# Patient Record
Sex: FEMALE | Race: BLACK OR AFRICAN AMERICAN | Employment: OTHER | URBAN - METROPOLITAN AREA
[De-identification: names, ages, dates, MRNs, and addresses within clinical notes are randomized per-mention and may not be internally consistent; named-entity substitution may affect disease eponyms.]

---

## 2018-12-20 ENCOUNTER — HOSPITAL ENCOUNTER (OUTPATIENT)
Dept: PREADMISSION TESTING | Age: 63
Discharge: HOME | End: 2018-12-20
Payer: MEDICARE

## 2018-12-20 VITALS — WEIGHT: 181 LBS | HEIGHT: 65 IN | BODY MASS INDEX: 30.16 KG/M2

## 2018-12-20 LAB
ANION GAP SERPL CALC-SCNC: 6 MMOL/L (ref 3–18)
BASOPHILS # BLD: 0 K/UL (ref 0–0.1)
BASOPHILS NFR BLD: 0 % (ref 0–2)
BUN SERPL-MCNC: 12 MG/DL (ref 7–18)
BUN/CREAT SERPL: 17
CALCIUM SERPL-MCNC: 9.6 MG/DL (ref 8.5–10.1)
CHLORIDE SERPL-SCNC: 104 MMOL/L (ref 100–108)
CO2 SERPL-SCNC: 31 MMOL/L (ref 21–32)
CREAT SERPL-MCNC: 0.69 MG/DL (ref 0.6–1.3)
DIFFERENTIAL METHOD BLD: ABNORMAL
EOSINOPHIL # BLD: 0.2 K/UL (ref 0–0.4)
EOSINOPHIL NFR BLD: 2 % (ref 0–5)
ERYTHROCYTE [DISTWIDTH] IN BLOOD BY AUTOMATED COUNT: 17.1 % (ref 11.6–14.5)
EST. AVERAGE GLUCOSE BLD GHB EST-MCNC: 157 MG/DL
GLUCOSE SERPL-MCNC: 68 MG/DL (ref 74–99)
HBA1C MFR BLD: 7.1 % (ref 4.2–5.6)
HCT VFR BLD AUTO: 36.2 % (ref 35–45)
HGB BLD-MCNC: 10.7 G/DL (ref 12–16)
LYMPHOCYTES # BLD: 1.9 K/UL (ref 0.9–3.6)
LYMPHOCYTES NFR BLD: 18 % (ref 21–52)
MCH RBC QN AUTO: 22.1 PG (ref 24–34)
MCHC RBC AUTO-ENTMCNC: 29.6 G/DL (ref 31–37)
MCV RBC AUTO: 74.6 FL (ref 74–97)
MONOCYTES # BLD: 0.7 K/UL (ref 0.05–1.2)
MONOCYTES NFR BLD: 7 % (ref 3–10)
NEUTS SEG # BLD: 7.8 K/UL (ref 1.8–8)
NEUTS SEG NFR BLD: 73 % (ref 40–73)
PLATELET # BLD AUTO: 411 K/UL (ref 135–420)
PMV BLD AUTO: 8.8 FL (ref 9.2–11.8)
POTASSIUM SERPL-SCNC: 3.8 MMOL/L (ref 3.5–5.5)
RBC # BLD AUTO: 4.85 M/UL (ref 4.2–5.3)
SODIUM SERPL-SCNC: 141 MMOL/L (ref 136–145)
WBC # BLD AUTO: 10.5 K/UL (ref 4.6–13.2)

## 2018-12-20 PROCEDURE — 85025 COMPLETE CBC W/AUTO DIFF WBC: CPT

## 2018-12-20 PROCEDURE — 83036 HEMOGLOBIN GLYCOSYLATED A1C: CPT

## 2018-12-20 PROCEDURE — 36415 COLL VENOUS BLD VENIPUNCTURE: CPT

## 2018-12-20 PROCEDURE — 80048 BASIC METABOLIC PNL TOTAL CA: CPT

## 2018-12-20 RX ORDER — TRAMADOL HYDROCHLORIDE 50 MG/1
50 TABLET ORAL
COMMUNITY
End: 2019-05-19

## 2018-12-20 RX ORDER — LANOLIN ALCOHOL/MO/W.PET/CERES
CREAM (GRAM) TOPICAL
COMMUNITY
End: 2021-02-01

## 2018-12-20 RX ORDER — CLONIDINE HYDROCHLORIDE 0.2 MG/1
0.2 TABLET ORAL 2 TIMES DAILY
COMMUNITY

## 2018-12-20 RX ORDER — DIPHENHYDRAMINE HCL 25 MG
25 CAPSULE ORAL
COMMUNITY
End: 2018-12-28

## 2018-12-20 RX ORDER — CEFAZOLIN SODIUM/WATER 2 G/20 ML
2 SYRINGE (ML) INTRAVENOUS ONCE
Status: CANCELLED | OUTPATIENT
Start: 2018-12-20 | End: 2018-12-20

## 2018-12-20 RX ORDER — IBUPROFEN 800 MG/1
800 TABLET ORAL AS NEEDED
COMMUNITY
End: 2018-12-28

## 2018-12-20 RX ORDER — VALACYCLOVIR HYDROCHLORIDE 1 G/1
TABLET, FILM COATED ORAL DAILY
COMMUNITY

## 2018-12-20 NOTE — PERIOP NOTES
Uses CPAP and was instructed to bring on admission. No removable prosthetic devices or family history of malignant hyperthermia. Care fusion kit and instructions given and reviewed. PCP is aware of the surgery. No participation in clinical trial or research study. Does not meet criteria for special population at this time. Pt states she was told to stop aspirin 7 days prior to surgery.

## 2018-12-28 ENCOUNTER — HOSPITAL ENCOUNTER (EMERGENCY)
Age: 63
Discharge: HOME | End: 2018-12-29
Attending: EMERGENCY MEDICINE
Payer: MEDICARE

## 2018-12-28 DIAGNOSIS — R10.31 RLQ ABDOMINAL PAIN: Primary | ICD-10-CM

## 2018-12-28 DIAGNOSIS — R19.7 NAUSEA VOMITING AND DIARRHEA: ICD-10-CM

## 2018-12-28 DIAGNOSIS — R11.2 NAUSEA VOMITING AND DIARRHEA: ICD-10-CM

## 2018-12-28 PROCEDURE — 93005 ELECTROCARDIOGRAM TRACING: CPT

## 2018-12-28 PROCEDURE — 99283 EMERGENCY DEPT VISIT LOW MDM: CPT

## 2018-12-28 RX ORDER — SODIUM CHLORIDE 0.9 % (FLUSH) 0.9 %
5-10 SYRINGE (ML) INJECTION AS NEEDED
Status: DISCONTINUED | OUTPATIENT
Start: 2018-12-28 | End: 2018-12-29 | Stop reason: HOSPADM

## 2018-12-29 ENCOUNTER — APPOINTMENT (OUTPATIENT)
Dept: CT IMAGING | Age: 63
End: 2018-12-29
Attending: PHYSICIAN ASSISTANT
Payer: MEDICARE

## 2018-12-29 VITALS
HEART RATE: 100 BPM | BODY MASS INDEX: 30.9 KG/M2 | SYSTOLIC BLOOD PRESSURE: 157 MMHG | OXYGEN SATURATION: 100 % | HEIGHT: 64 IN | WEIGHT: 181 LBS | DIASTOLIC BLOOD PRESSURE: 91 MMHG | RESPIRATION RATE: 18 BRPM | TEMPERATURE: 98.9 F

## 2018-12-29 LAB
ALBUMIN SERPL-MCNC: 3.7 G/DL (ref 3.4–5)
ALBUMIN/GLOB SERPL: 1 {RATIO} (ref 0.8–1.7)
ALP SERPL-CCNC: 133 U/L (ref 45–117)
ALT SERPL-CCNC: 17 U/L (ref 13–56)
ANION GAP SERPL CALC-SCNC: 7 MMOL/L (ref 3–18)
APPEARANCE UR: ABNORMAL
AST SERPL-CCNC: 11 U/L (ref 15–37)
BACTERIA URNS QL MICRO: ABNORMAL /HPF
BASOPHILS # BLD: 0 K/UL (ref 0–0.1)
BASOPHILS NFR BLD: 0 % (ref 0–2)
BILIRUB SERPL-MCNC: 0.3 MG/DL (ref 0.2–1)
BILIRUB UR QL: NEGATIVE
BUN SERPL-MCNC: 14 MG/DL (ref 7–18)
BUN/CREAT SERPL: 16
CALCIUM SERPL-MCNC: 9.8 MG/DL (ref 8.5–10.1)
CHLORIDE SERPL-SCNC: 104 MMOL/L (ref 100–108)
CO2 SERPL-SCNC: 29 MMOL/L (ref 21–32)
COLOR UR: YELLOW
CREAT SERPL-MCNC: 0.9 MG/DL (ref 0.6–1.3)
DIFFERENTIAL METHOD BLD: ABNORMAL
EOSINOPHIL # BLD: 0.1 K/UL (ref 0–0.4)
EOSINOPHIL NFR BLD: 1 % (ref 0–5)
EPITH CASTS URNS QL MICRO: ABNORMAL /LPF (ref 0–5)
ERYTHROCYTE [DISTWIDTH] IN BLOOD BY AUTOMATED COUNT: 16.8 % (ref 11.6–14.5)
GLOBULIN SER CALC-MCNC: 3.7 G/DL (ref 2–4)
GLUCOSE SERPL-MCNC: 122 MG/DL (ref 74–99)
GLUCOSE UR STRIP.AUTO-MCNC: NEGATIVE MG/DL
HCT VFR BLD AUTO: 37.2 % (ref 35–45)
HGB BLD-MCNC: 11.4 G/DL (ref 12–16)
HGB UR QL STRIP: NEGATIVE
KETONES UR QL STRIP.AUTO: ABNORMAL MG/DL
LACTATE SERPL-SCNC: 1.5 MMOL/L (ref 0.4–2)
LEUKOCYTE ESTERASE UR QL STRIP.AUTO: ABNORMAL
LYMPHOCYTES # BLD: 1 K/UL (ref 0.9–3.6)
LYMPHOCYTES NFR BLD: 7 % (ref 21–52)
MCH RBC QN AUTO: 22.2 PG (ref 24–34)
MCHC RBC AUTO-ENTMCNC: 30.6 G/DL (ref 31–37)
MCV RBC AUTO: 72.4 FL (ref 74–97)
MONOCYTES # BLD: 0.7 K/UL (ref 0.05–1.2)
MONOCYTES NFR BLD: 4 % (ref 3–10)
MUCOUS THREADS URNS QL MICRO: ABNORMAL /LPF
NEUTS SEG # BLD: 13.6 K/UL (ref 1.8–8)
NEUTS SEG NFR BLD: 88 % (ref 40–73)
NITRITE UR QL STRIP.AUTO: NEGATIVE
PH UR STRIP: 5.5 [PH] (ref 5–8)
PLATELET # BLD AUTO: 460 K/UL (ref 135–420)
PMV BLD AUTO: 8.6 FL (ref 9.2–11.8)
POTASSIUM SERPL-SCNC: 3.9 MMOL/L (ref 3.5–5.5)
PROT SERPL-MCNC: 7.4 G/DL (ref 6.4–8.2)
PROT UR STRIP-MCNC: 100 MG/DL
RBC # BLD AUTO: 5.14 M/UL (ref 4.2–5.3)
RBC #/AREA URNS HPF: NEGATIVE /HPF (ref 0–5)
SODIUM SERPL-SCNC: 140 MMOL/L (ref 136–145)
SP GR UR REFRACTOMETRY: 1.03 (ref 1–1.03)
UROBILINOGEN UR QL STRIP.AUTO: 0.2 EU/DL (ref 0.2–1)
WBC # BLD AUTO: 15.4 K/UL (ref 4.6–13.2)
WBC URNS QL MICRO: ABNORMAL /HPF (ref 0–5)

## 2018-12-29 PROCEDURE — 81001 URINALYSIS AUTO W/SCOPE: CPT

## 2018-12-29 PROCEDURE — 87040 BLOOD CULTURE FOR BACTERIA: CPT

## 2018-12-29 PROCEDURE — 80053 COMPREHEN METABOLIC PANEL: CPT

## 2018-12-29 PROCEDURE — 96375 TX/PRO/DX INJ NEW DRUG ADDON: CPT

## 2018-12-29 PROCEDURE — 74011250636 HC RX REV CODE- 250/636: Performed by: PHYSICIAN ASSISTANT

## 2018-12-29 PROCEDURE — 96365 THER/PROPH/DIAG IV INF INIT: CPT

## 2018-12-29 PROCEDURE — 85025 COMPLETE CBC W/AUTO DIFF WBC: CPT

## 2018-12-29 PROCEDURE — 74011000258 HC RX REV CODE- 258: Performed by: PHYSICIAN ASSISTANT

## 2018-12-29 PROCEDURE — 83605 ASSAY OF LACTIC ACID: CPT

## 2018-12-29 PROCEDURE — 96361 HYDRATE IV INFUSION ADD-ON: CPT

## 2018-12-29 PROCEDURE — 74176 CT ABD & PELVIS W/O CONTRAST: CPT

## 2018-12-29 RX ORDER — ONDANSETRON 2 MG/ML
INJECTION INTRAMUSCULAR; INTRAVENOUS
Status: DISCONTINUED
Start: 2018-12-29 | End: 2018-12-29 | Stop reason: HOSPADM

## 2018-12-29 RX ORDER — ONDANSETRON 4 MG/1
4 TABLET, ORALLY DISINTEGRATING ORAL
Qty: 20 TAB | Refills: 0 | Status: SHIPPED | OUTPATIENT
Start: 2018-12-29 | End: 2021-02-01

## 2018-12-29 RX ORDER — PROMETHAZINE HYDROCHLORIDE 25 MG/1
25 TABLET ORAL
Qty: 12 TAB | Refills: 0 | Status: SHIPPED | OUTPATIENT
Start: 2018-12-29 | End: 2019-05-19

## 2018-12-29 RX ORDER — ONDANSETRON 2 MG/ML
4 INJECTION INTRAMUSCULAR; INTRAVENOUS
Status: COMPLETED | OUTPATIENT
Start: 2018-12-29 | End: 2018-12-29

## 2018-12-29 RX ADMIN — SODIUM CHLORIDE 1000 ML: 900 INJECTION, SOLUTION INTRAVENOUS at 00:15

## 2018-12-29 RX ADMIN — PROMETHAZINE HYDROCHLORIDE 25 MG: 25 INJECTION, SOLUTION INTRAMUSCULAR; INTRAVENOUS at 02:31

## 2018-12-29 RX ADMIN — SODIUM CHLORIDE 641 ML: 900 INJECTION, SOLUTION INTRAVENOUS at 00:16

## 2018-12-29 RX ADMIN — ONDANSETRON 4 MG: 2 INJECTION INTRAMUSCULAR; INTRAVENOUS at 01:34

## 2018-12-29 NOTE — DISCHARGE INSTRUCTIONS
Abdominal Pain: Care Instructions  Your Care Instructions    Abdominal pain has many possible causes. Some aren't serious and get better on their own in a few days. Others need more testing and treatment. If your pain continues or gets worse, you need to be rechecked and may need more tests to find out what is wrong. You may need surgery to correct the problem. Don't ignore new symptoms, such as fever, nausea and vomiting, urination problems, pain that gets worse, and dizziness. These may be signs of a more serious problem. Your doctor may have recommended a follow-up visit in the next 8 to 12 hours. If you are not getting better, you may need more tests or treatment. The doctor has checked you carefully, but problems can develop later. If you notice any problems or new symptoms, get medical treatment right away. Follow-up care is a key part of your treatment and safety. Be sure to make and go to all appointments, and call your doctor if you are having problems. It's also a good idea to know your test results and keep a list of the medicines you take. How can you care for yourself at home? · Rest until you feel better. · To prevent dehydration, drink plenty of fluids, enough so that your urine is light yellow or clear like water. Choose water and other caffeine-free clear liquids until you feel better. If you have kidney, heart, or liver disease and have to limit fluids, talk with your doctor before you increase the amount of fluids you drink. · If your stomach is upset, eat mild foods, such as rice, dry toast or crackers, bananas, and applesauce. Try eating several small meals instead of two or three large ones. · Wait until 48 hours after all symptoms have gone away before you have spicy foods, alcohol, and drinks that contain caffeine. · Do not eat foods that are high in fat. · Avoid anti-inflammatory medicines such as aspirin, ibuprofen (Advil, Motrin), and naproxen (Aleve).  These can cause stomach upset. Talk to your doctor if you take daily aspirin for another health problem. When should you call for help? Call 911 anytime you think you may need emergency care. For example, call if:    · You passed out (lost consciousness).     · You pass maroon or very bloody stools.     · You vomit blood or what looks like coffee grounds.     · You have new, severe belly pain.    Call your doctor now or seek immediate medical care if:    · Your pain gets worse, especially if it becomes focused in one area of your belly.     · You have a new or higher fever.     · Your stools are black and look like tar, or they have streaks of blood.     · You have unexpected vaginal bleeding.     · You have symptoms of a urinary tract infection. These may include:  ? Pain when you urinate. ? Urinating more often than usual.  ? Blood in your urine.     · You are dizzy or lightheaded, or you feel like you may faint.    Watch closely for changes in your health, and be sure to contact your doctor if:    · You are not getting better after 1 day (24 hours). Where can you learn more? Go to http://raimundo-joselyn.info/. Enter N530 in the search box to learn more about \"Abdominal Pain: Care Instructions. \"  Current as of: November 20, 2017  Content Version: 11.8  © 1687-5026 [employer]. Care instructions adapted under license by [employer] (which disclaims liability or warranty for this information). If you have questions about a medical condition or this instruction, always ask your healthcare professional. [place] [address] any warranty or liability for your use of this information. Nausea and Vomiting: Care Instructions  Your Care Instructions    When you are nauseated, you may feel weak and sweaty and notice a lot of saliva in your mouth. Nausea often leads to vomiting.  Most of the time you do not need to worry about nausea and vomiting, but they can be signs of other illnesses. Two common causes of nausea and vomiting are stomach flu and food poisoning. Nausea and vomiting from viral stomach flu will usually start to improve within 24 hours. Nausea and vomiting from food poisoning may last from 12 to 48 hours. The doctor has checked you carefully, but problems can develop later. If you notice any problems or new symptoms, get medical treatment right away. Follow-up care is a key part of your treatment and safety. Be sure to make and go to all appointments, and call your doctor if you are having problems. It's also a good idea to know your test results and keep a list of the medicines you take. How can you care for yourself at home? · To prevent dehydration, drink plenty of fluids, enough so that your urine is light yellow or clear like water. Choose water and other caffeine-free clear liquids until you feel better. If you have kidney, heart, or liver disease and have to limit fluids, talk with your doctor before you increase the amount of fluids you drink. · Rest in bed until you feel better. · When you are able to eat, try clear soups, mild foods, and liquids until all symptoms are gone for 12 to 48 hours. Other good choices include dry toast, crackers, cooked cereal, and gelatin dessert, such as Jell-O. When should you call for help? Call 911 anytime you think you may need emergency care. For example, call if:    · You passed out (lost consciousness).    Call your doctor now or seek immediate medical care if:    · You have symptoms of dehydration, such as:  ? Dry eyes and a dry mouth. ? Passing only a little dark urine. ?  Feeling thirstier than usual.     · You have new or worsening belly pain.     · You have a new or higher fever.     · You vomit blood or what looks like coffee grounds.    Watch closely for changes in your health, and be sure to contact your doctor if:    · You have ongoing nausea and vomiting.     · Your vomiting is getting worse.     · Your vomiting lasts longer than 2 days.     · You are not getting better as expected. Where can you learn more? Go to http://raimundo-joselyn.info/. Enter 25 [id number] in the search box to learn more about \"Nausea and Vomiting: Care Instructions. \"  Current as of: November 20, 2017  Content Version: 11.8  © 3664-0907 [employer]. Care instructions adapted under license by [employer] (which disclaims liability or warranty for this information). If you have questions about a medical condition or this instruction, always ask your healthcare professional. [place] [address] any warranty or liability for your use of this information.

## 2018-12-29 NOTE — ED NOTES
Patient requesting water with ice to drink, advised patient that this would not be a good idea at this time due to CT scan pending. She still requested water, this nurse did not give.

## 2018-12-29 NOTE — ED TRIAGE NOTES
Presented to ED to be evaluated for reported RLQ pain, fever, nausea/vomiting, diarrhea. X 3 days. Sepsis Screening completed (  )Patient meets SIRS criteria. (x  )Patient does not meet SIRS criteria. SIRS Criteria is achieved when two or more of the following are present ? Temperature < 96.8°F (36°C) or > 100.9°F (38.3°C) ? Heart Rate > 90 beats per minute ? Respiratory Rate > 20 breaths per minute ? WBC count > 12,000 or <4,000 or > 10% bands

## 2018-12-29 NOTE — ED PROVIDER NOTES
EMERGENCY DEPARTMENT HISTORY AND PHYSICAL EXAM 
 
Date: 12/28/2018 Patient Name: Placido Schmidt History of Presenting Illness Chief Complaint Patient presents with  Abdominal Pain  Fever  Diarrhea History Provided By: Patient Chief Complaint: Abdominal Pain Duration: 3 Days Timing:  Acute Location: RLQ Quality: Maria Elena Peralta Severity: Mild Modifying Factors: No modifying factors Associated Symptoms: n/v/d, chills Additional History (Context):  
11:33 PM 
Placido Schmidt is a 61 y.o. female with PMHX of Anxiety, Depression, GERD, Arrhythmia, HTN, DM, OA, Neuropathy, Sleep apnea, who presents to the emergency department C/O RLQ abdominal pain onset 3 days ago. Associated sxs include n/v/d, chills. Pt denies dysuria, blood in stool, fever, taking any medications for relief of sxs, and any other sxs or complaints. PCP: Debi Salinas MD 
 
Current Facility-Administered Medications Medication Dose Route Frequency Provider Last Rate Last Dose  ondansetron (ZOFRAN) 4 mg/2 mL injection  iopamidol (ISOVUE 300) 61 % contrast injection 100 mL  100 mL IntraVENous RAD ONCE Jeniffer Hunter MD      
 promethazine (PHENERGAN) 25 mg in 0.9% sodium chloride 50 mL IVPB  25 mg IntraVENous NOW Bob Davis PA      
 sodium chloride (NS) flush 5-10 mL  5-10 mL IntraVENous PRN Rudy Davis PA Current Outpatient Medications Medication Sig Dispense Refill  ondansetron (ZOFRAN ODT) 4 mg disintegrating tablet Take 1 Tab by mouth every eight (8) hours as needed for Nausea. 20 Tab 0  promethazine (PHENERGAN) 25 mg tablet Take 1 Tab by mouth every six (6) hours as needed. 12 Tab 0  cloNIDine HCl (CATAPRES) 0.2 mg tablet Take 0.2 mg by mouth two (2) times a day.  Omega-3 Fatty Acids (FISH OIL) 500 mg cap Take  by mouth daily.  ferrous sulfate (IRON) 325 mg (65 mg iron) tablet Take  by mouth Daily (before breakfast).  lidocaine-kinesiology tape 5 % kit Apply  to affected area as needed.  traMADol (ULTRAM) 50 mg tablet Take 50 mg by mouth every six (6) hours as needed for Pain.  valACYclovir (VALTREX) 1 gram tablet Take  by mouth daily.  diltiazem LA (CARDIZEM LA) 420 mg ER tablet Take 420 mg by mouth daily.  metFORMIN (GLUCOPHAGE) 500 mg tablet Take 500 mg by mouth two (2) times daily (with meals).  aspirin delayed-release 81 mg tablet Take  by mouth daily.  gabapentin (NEURONTIN) 100 mg capsule Take 300 mg by mouth two (2) times a day.  potassium chloride (K-DUR, KLOR-CON) 20 mEq tablet Take 20 mEq by mouth every other day. Past History Past Medical History: 
Past Medical History:  
Diagnosis Date  Arrhythmia 1990's SVT- from low potassium  Chronic pain   
 neck, back, left shoulder  Diabetes [facility]) Summer 2018  GERD (gastroesophageal reflux disease)  Hypercholesterolemia   
 taken off medication 1990's  Hypertension late 1970's  Insomnia  Menopause  Neuropathy  OA (osteoarthritis)  Psychiatric disorder   
 depression, anxiety  Sleep apnea   
 uses CPAP Past Surgical History: 
Past Surgical History:  
Procedure Laterality Date  HX BREAST BIOPSY    
 right  HX CERVICAL FUSION  2012  
 anterior  HX GI  2017  
 colonoscopy  HX HYSTERECTOMY  1991  
 HX KNEE ARTHROSCOPY  2011  
 right  HX OTHER SURGICAL    
 hemorrhoidectomy  HX SHOULDER ARTHROSCOPY    
 left  HX TONSILLECTOMY  GIORGI BIOPSY BREAST STEREOTACTIC    
 right Family History: 
Family History Problem Relation Age of Onset  Malignant Hyperthermia Neg Hx  Pseudocholinesterase Deficiency Neg Hx  Delayed Awakening Neg Hx  Post-op Nausea/Vomiting Neg Hx  Emergence Delirium Neg Hx  Post-op Cognitive Dysfunction Neg Hx   
 Other Neg Hx Social History: 
Social History Tobacco Use  Smoking status: Never Smoker  Smokeless tobacco: Never Used Substance Use Topics  Alcohol use: No  
 Drug use: No  
 
 
Allergies: Allergies Allergen Reactions  Acetaminophen Itching Restless legs  Vicodin [Hydrocodone-Acetaminophen] Itching Review of Systems Review of Systems Constitutional: Positive for chills. Negative for fever. Gastrointestinal: Positive for abdominal pain (RLQ), diarrhea, nausea and vomiting. Negative for blood in stool. All other systems reviewed and are negative. Physical Exam  
 
Vitals:  
 12/28/18 2223 BP: 142/80 Pulse: (!) 102 Resp: 20 Temp: 99.9 °F (37.7 °C) SpO2: 100% Weight: 82.1 kg (181 lb) Height: 5' 4\" (1.626 m) Physical Exam  
Constitutional: She is oriented to person, place, and time. She appears well-developed and well-nourished. No distress. HENT:  
Head: Normocephalic and atraumatic. Eyes: Conjunctivae and EOM are normal. Pupils are equal, round, and reactive to light. Neck: Normal range of motion. Neck supple. Cardiovascular: Normal rate and regular rhythm. Pulmonary/Chest: Effort normal and breath sounds normal.  
Abdominal: Soft. Bowel sounds are normal. She exhibits no distension. There is no tenderness. There is no rebound, no guarding and no CVA tenderness. Points to RLQ as site of pain but NO ttp on exam  
Musculoskeletal: Normal range of motion. Neurological: She is alert and oriented to person, place, and time. Skin: Skin is warm and dry. Psychiatric: She has a normal mood and affect. Her behavior is normal.  
Nursing note and vitals reviewed. Diagnostic Study Results Labs - Recent Results (from the past 12 hour(s)) EKG, 12 LEAD, INITIAL Collection Time: 12/28/18 10:31 PM  
Result Value Ref Range Ventricular Rate 105 BPM  
 Atrial Rate 105 BPM  
 P-R Interval 160 ms QRS Duration 82 ms Q-T Interval 336 ms  
 QTC Calculation (Bezet) 444 ms Calculated P Axis 37 degrees Calculated R Axis -12 degrees Calculated T Axis 20 degrees Diagnosis Sinus tachycardia with premature atrial complexes Otherwise normal ECG When compared with ECG of 20-SEP-2013 08:53, 
premature atrial complexes are now present LACTIC ACID Collection Time: 12/29/18 12:01 AM  
Result Value Ref Range Lactic acid 1.5 0.4 - 2.0 MMOL/L  
URINALYSIS W/ RFLX MICROSCOPIC Collection Time: 12/29/18 12:07 AM  
Result Value Ref Range Color YELLOW Appearance CLOUDY Specific gravity 1.027 1.005 - 1.030    
 pH (UA) 5.5 5.0 - 8.0 Protein 100 (A) NEG mg/dL Glucose NEGATIVE  NEG mg/dL Ketone TRACE (A) NEG mg/dL Bilirubin NEGATIVE  NEG Blood NEGATIVE  NEG Urobilinogen 0.2 0.2 - 1.0 EU/dL Nitrites NEGATIVE  NEG Leukocyte Esterase TRACE (A) NEG    
METABOLIC PANEL, COMPREHENSIVE Collection Time: 12/29/18 12:07 AM  
Result Value Ref Range Sodium 140 136 - 145 mmol/L Potassium 3.9 3.5 - 5.5 mmol/L Chloride 104 100 - 108 mmol/L  
 CO2 29 21 - 32 mmol/L Anion gap 7 3.0 - 18 mmol/L Glucose 122 (H) 74 - 99 mg/dL BUN 14 7.0 - 18 MG/DL Creatinine 0.90 0.6 - 1.3 MG/DL  
 BUN/Creatinine ratio 16 GFR est AA >60 >60 ml/min/1.73m2 GFR est non-AA >60 >60 ml/min/1.73m2 Calcium 9.8 8.5 - 10.1 MG/DL Bilirubin, total 0.3 0.2 - 1.0 MG/DL  
 ALT (SGPT) 17 13 - 56 U/L  
 AST (SGOT) 11 (L) 15 - 37 U/L Alk. phosphatase 133 (H) 45 - 117 U/L Protein, total 7.4 6.4 - 8.2 g/dL Albumin 3.7 3.4 - 5.0 g/dL Globulin 3.7 2.0 - 4.0 g/dL A-G Ratio 1.0 0.8 - 1.7    
CBC WITH AUTOMATED DIFF Collection Time: 12/29/18 12:07 AM  
Result Value Ref Range WBC 15.4 (H) 4.6 - 13.2 K/uL  
 RBC 5.14 4.20 - 5.30 M/uL  
 HGB 11.4 (L) 12.0 - 16.0 g/dL HCT 37.2 35.0 - 45.0 % MCV 72.4 (L) 74.0 - 97.0 FL  
 MCH 22.2 (L) 24.0 - 34.0 PG  
 MCHC 30.6 (L) 31.0 - 37.0 g/dL  
 RDW 16.8 (H) 11.6 - 14.5 % PLATELET 194 (H) 759 - 420 K/uL MPV 8.6 (L) 9.2 - 11.8 FL  
 NEUTROPHILS 88 (H) 40 - 73 % LYMPHOCYTES 7 (L) 21 - 52 % MONOCYTES 4 3 - 10 % EOSINOPHILS 1 0 - 5 % BASOPHILS 0 0 - 2 %  
 ABS. NEUTROPHILS 13.6 (H) 1.8 - 8.0 K/UL  
 ABS. LYMPHOCYTES 1.0 0.9 - 3.6 K/UL  
 ABS. MONOCYTES 0.7 0.05 - 1.2 K/UL  
 ABS. EOSINOPHILS 0.1 0.0 - 0.4 K/UL  
 ABS. BASOPHILS 0.0 0.0 - 0.1 K/UL  
 DF AUTOMATED URINE MICROSCOPIC ONLY Collection Time: 12/29/18 12:07 AM  
Result Value Ref Range WBC 3 to 5 0 - 5 /hpf  
 RBC NEGATIVE  0 - 5 /hpf Epithelial cells FEW 0 - 5 /lpf Bacteria RARE NEG /hpf Mucus 3+ (A) NEG /lpf Radiologic Studies -  
CT ABD PELV WO CONT Final Result IMPRESSION:  
  
Appendix is not clearly seen, but there are no secondary findings to suggest  
acute appendicitis. No additional acute abnormality otherwise throughout the  
abdomen or pelvis to explain patient's right lower quadrant abdominal pain. CT Results  (Last 48 hours) 12/29/18 0129  CT ABD PELV WO CONT Final result Impression:  IMPRESSION:  
   
Appendix is not clearly seen, but there are no secondary findings to suggest  
acute appendicitis. No additional acute abnormality otherwise throughout the  
abdomen or pelvis to explain patient's right lower quadrant abdominal pain. Narrative:  EXAM: CT ABD PELV WO CONT  
   
CLINICAL INDICATION/HISTORY: Right lower quadrant abdominal pain COMPARISON: None. TECHNIQUE:   CT of the abdomen and pelvis without intravenous contrast  
administration. Coronal and sagittal reformats were generated and reviewed. One or more dose reduction techniques were used on this CT: automated exposure  
control, adjustment of the mAs and/or kVp according to patient size, and  
iterative reconstruction techniques. The specific techniques used on this CT  
exam have been documented in the patient's electronic medical record. _______________ FINDINGS:  
   
LOWER THORAX:  No acute pulmonary infiltrate. No pleural effusion. No global  
cardiomegaly or pericardial effusion. LIVER AND BILIARY: No suspicious liver lesions on this unenhanced CT. No  
biliary dilation. Gallbladder unremarkable. SPLEEN:  Normal.  
   
PANCREAS:  Normal.  
   
ADRENALS:  Normal.  
   
KIDNEYS:  Normal.  
   
LYMPH NODES:  No mesenteric or retroperitoneal lymphadenopathy. GI TRACT:  Small hiatal hernia. Normal caliber small and large bowel loops. No  
morphology of bowel obstruction. No bowel wall thickening. Appendix is not  
clearly seen, but there are no secondary findings to suggest acute appendicitis. PELVIC ORGANS:  Bladder is normal in appearance. No acute abnormality. VASCULATURE:  Normal caliber lower thoracic and abdominal aorta with mild  
atherosclerotic vascular calcification OTHER:   No ascites or free intraperitoneal air. OSSEOUS STRUCTURES:  No acute osseous abnormality. Mild degenerative disk  
disease and facet arthropathy focally at L5-S1.  
   
_______________ CXR Results  (Last 48 hours) None Medications given in the ED- Medications  
sodium chloride (NS) flush 5-10 mL (not administered) iopamidol (ISOVUE 300) 61 % contrast injection 100 mL (not administered)  
ondansetron (ZOFRAN) 4 mg/2 mL injection (not administered)  
promethazine (PHENERGAN) 25 mg in 0.9% sodium chloride 50 mL IVPB (not administered)  
sodium chloride 0.9 % bolus infusion 1,000 mL (1,000 mL IntraVENous New Bag 12/29/18 0015) Followed by  
sodium chloride 0.9 % bolus infusion 641 mL (641 mL IntraVENous New Bag 12/29/18 0016) ondansetron [facility]) injection 4 mg (4 mg IntraVENous Given 12/29/18 0134) Medical Decision Making I am the first provider for this patient.  
 
I reviewed the vital signs, available nursing notes, past medical history, past surgical history, family history and social history. Vital Signs-Reviewed the patient's vital signs. Pulse Oximetry Analysis - 100% on RA Cardiac Monitor: 
Rate: 105 bpm 
Rhythm: Sinus tachycardia EKG interpretation: (Preliminary) 10:33 PM  
Sinus tachycardia, 105 bpm, QRS 82 ms EKG read by Lore Marin MD at 10:31 PM  
 
Records Reviewed: Nursing Notes and Old Medical Records Procedures: 
Procedures ED Course:  
11:33 PM Initial assessment performed. The patients presenting problems have been discussed, and they are in agreement with the care plan formulated and outlined with them. I have encouraged them to ask questions as they arise throughout their visit. 1:25 AM Jorge from CT called and said pt declined IV contrast. CT will be done dry. 2:11 AM Discussed patient's history, exam, and available diagnostics results with [employer], MD, ED Attending, who agree with d/c plan. Likely viral illness n/v/d since no findings on ct to show acute process. Diagnosis and Disposition DISCHARGE NOTE: 
2:16 AM  
Flavia Arroyo  results have been reviewed with her. She has been counseled regarding her diagnosis, treatment, and plan. She verbally conveys understanding and agreement of the signs, symptoms, diagnosis, treatment and prognosis and additionally agrees to follow up as discussed. She also agrees with the care-plan and conveys that all of her questions have been answered. I have also provided discharge instructions for her that include: educational information regarding their diagnosis and treatment, and list of reasons why they would want to return to the ED prior to their follow-up appointment, should her condition change. She has been provided with education for proper emergency department utilization. CLINICAL IMPRESSION: 
 
1. RLQ abdominal pain 2. Nausea vomiting and diarrhea PLAN: 
1. D/C Home 2.   
Current Discharge Medication List  
  
 START taking these medications Details  
ondansetron (ZOFRAN ODT) 4 mg disintegrating tablet Take 1 Tab by mouth every eight (8) hours as needed for Nausea. Qty: 20 Tab, Refills: 0  
  
promethazine (PHENERGAN) 25 mg tablet Take 1 Tab by mouth every six (6) hours as needed. Qty: 12 Tab, Refills: 0  
  
  
 
3. Follow-up Information Follow up With Specialties Details Why Contact Info Scott Mcnair MD Internal Medicine Schedule an appointment as soon as possible for a visit  [address] [place] [address] 
[phone] [facility] EMERGENCY DEPT Emergency Medicine  As needed, If symptoms worsen [address] [place] [id number] [phone]  
  
 
_______________________________ Attestations: This note is prepared by ALEJANDRO VOGT and Kim Carrion, acting as Scribe for  Adi Anthony PA-C. Adi Anthony PA-C:  The scribe's documentation has been prepared under my direction and personally reviewed by me in its entirety. I confirm that the note above accurately reflects all work, treatment, procedures, and medical decision making performed by me. 
_______________________________

## 2019-01-04 LAB
BACTERIA SPEC CULT: NORMAL
BACTERIA SPEC CULT: NORMAL
SERVICE CMNT-IMP: NORMAL
SERVICE CMNT-IMP: NORMAL

## 2019-01-04 RX ORDER — SODIUM CHLORIDE 0.9 % (FLUSH) 0.9 %
5-10 SYRINGE (ML) INJECTION EVERY 8 HOURS
Status: CANCELLED | OUTPATIENT
Start: 2019-01-04

## 2019-01-04 RX ORDER — SODIUM CHLORIDE 0.9 % (FLUSH) 0.9 %
5-10 SYRINGE (ML) INJECTION AS NEEDED
Status: CANCELLED | OUTPATIENT
Start: 2019-01-04

## 2019-01-07 ENCOUNTER — ANESTHESIA EVENT (OUTPATIENT)
Dept: SURGERY | Age: 64
End: 2019-01-07
Payer: MEDICARE

## 2019-01-07 ENCOUNTER — HOSPITAL ENCOUNTER (OUTPATIENT)
Age: 64
Setting detail: OUTPATIENT SURGERY
Discharge: HOME | End: 2019-01-07
Attending: ORTHOPAEDIC SURGERY | Admitting: ORTHOPAEDIC SURGERY
Payer: MEDICARE

## 2019-01-07 ENCOUNTER — ANESTHESIA (OUTPATIENT)
Dept: SURGERY | Age: 64
End: 2019-01-07
Payer: MEDICARE

## 2019-01-07 VITALS
OXYGEN SATURATION: 95 % | TEMPERATURE: 97.6 F | RESPIRATION RATE: 18 BRPM | HEART RATE: 66 BPM | SYSTOLIC BLOOD PRESSURE: 123 MMHG | DIASTOLIC BLOOD PRESSURE: 65 MMHG | WEIGHT: 178.56 LBS | HEIGHT: 64 IN | BODY MASS INDEX: 30.48 KG/M2

## 2019-01-07 DIAGNOSIS — M75.42 ROTATOR CUFF IMPINGEMENT SYNDROME OF LEFT SHOULDER: Primary | Chronic | ICD-10-CM

## 2019-01-07 LAB
GLUCOSE BLD STRIP.AUTO-MCNC: 105 MG/DL (ref 70–110)
GLUCOSE BLD STRIP.AUTO-MCNC: 114 MG/DL (ref 70–110)

## 2019-01-07 PROCEDURE — 74011250636 HC RX REV CODE- 250/636

## 2019-01-07 PROCEDURE — 77030020782 HC GWN BAIR PAWS FLX 3M -B: Performed by: ORTHOPAEDIC SURGERY

## 2019-01-07 PROCEDURE — 82962 GLUCOSE BLOOD TEST: CPT

## 2019-01-07 PROCEDURE — 77030012711 HC WND ARTHRO ABLT S&N -D: Performed by: ORTHOPAEDIC SURGERY

## 2019-01-07 PROCEDURE — 77030002916 HC SUT ETHLN J&J -A: Performed by: ORTHOPAEDIC SURGERY

## 2019-01-07 PROCEDURE — 74011250636 HC RX REV CODE- 250/636: Performed by: ORTHOPAEDIC SURGERY

## 2019-01-07 PROCEDURE — 76942 ECHO GUIDE FOR BIOPSY: CPT | Performed by: ORTHOPAEDIC SURGERY

## 2019-01-07 PROCEDURE — 77030036565 HC WRP CLDTHER ANK S2SG -A: Performed by: ORTHOPAEDIC SURGERY

## 2019-01-07 PROCEDURE — 77030003598 HC NDL MULT/FIRE ARTH -C: Performed by: ORTHOPAEDIC SURGERY

## 2019-01-07 PROCEDURE — 74011000250 HC RX REV CODE- 250: Performed by: ORTHOPAEDIC SURGERY

## 2019-01-07 PROCEDURE — 64415 NJX AA&/STRD BRCH PLXS IMG: CPT | Performed by: ANESTHESIOLOGY

## 2019-01-07 PROCEDURE — 77030032490 HC SLV COMPR SCD KNE COVD -B: Performed by: ORTHOPAEDIC SURGERY

## 2019-01-07 PROCEDURE — C1713 ANCHOR/SCREW BN/BN,TIS/BN: HCPCS | Performed by: ORTHOPAEDIC SURGERY

## 2019-01-07 PROCEDURE — 76010000149 HC OR TIME 1 TO 1.5 HR: Performed by: ORTHOPAEDIC SURGERY

## 2019-01-07 PROCEDURE — 77030034478 HC TU IRR ARTHRO PT ARTH -B: Performed by: ORTHOPAEDIC SURGERY

## 2019-01-07 PROCEDURE — 77030004451 HC BUR SHV S&N -B: Performed by: ORTHOPAEDIC SURGERY

## 2019-01-07 PROCEDURE — 76210000026 HC REC RM PH II 1 TO 1.5 HR: Performed by: ORTHOPAEDIC SURGERY

## 2019-01-07 PROCEDURE — 76060000033 HC ANESTHESIA 1 TO 1.5 HR: Performed by: ORTHOPAEDIC SURGERY

## 2019-01-07 PROCEDURE — 77030018835 HC SOL IRR LR ICUM -A: Performed by: ORTHOPAEDIC SURGERY

## 2019-01-07 PROCEDURE — 77030012508 HC MSK AIRWY LMA AMBU -A: Performed by: ANESTHESIOLOGY

## 2019-01-07 PROCEDURE — 77030006884 HC BLD SHV INCIS S&N -B: Performed by: ORTHOPAEDIC SURGERY

## 2019-01-07 PROCEDURE — 77030018673: Performed by: ORTHOPAEDIC SURGERY

## 2019-01-07 PROCEDURE — 74011000250 HC RX REV CODE- 250: Performed by: ANESTHESIOLOGY

## 2019-01-07 PROCEDURE — 77030033269 HC SLV COMPR SCD KNE2 CARD -B: Performed by: ORTHOPAEDIC SURGERY

## 2019-01-07 PROCEDURE — 76210000006 HC OR PH I REC 0.5 TO 1 HR: Performed by: ORTHOPAEDIC SURGERY

## 2019-01-07 DEVICE — MULTIFIX S-ULTRA 5.5MM KNOTLESS ANCHOR
Type: IMPLANTABLE DEVICE | Site: SHOULDER | Status: FUNCTIONAL
Brand: MULTIFIX

## 2019-01-07 RX ORDER — FLUMAZENIL 0.1 MG/ML
0.2 INJECTION INTRAVENOUS
Status: DISCONTINUED | OUTPATIENT
Start: 2019-01-07 | End: 2019-01-07 | Stop reason: HOSPADM

## 2019-01-07 RX ORDER — FENTANYL CITRATE 50 UG/ML
INJECTION, SOLUTION INTRAMUSCULAR; INTRAVENOUS
Status: COMPLETED
Start: 2019-01-07 | End: 2019-01-07

## 2019-01-07 RX ORDER — DEXTROSE 50 % IN WATER (D50W) INTRAVENOUS SYRINGE
25-50 AS NEEDED
Status: DISCONTINUED | OUTPATIENT
Start: 2019-01-07 | End: 2019-01-07 | Stop reason: HOSPADM

## 2019-01-07 RX ORDER — INSULIN LISPRO 100 [IU]/ML
INJECTION, SOLUTION INTRAVENOUS; SUBCUTANEOUS ONCE
Status: DISCONTINUED | OUTPATIENT
Start: 2019-01-07 | End: 2019-01-07 | Stop reason: HOSPADM

## 2019-01-07 RX ORDER — ONDANSETRON 2 MG/ML
INJECTION INTRAMUSCULAR; INTRAVENOUS AS NEEDED
Status: DISCONTINUED | OUTPATIENT
Start: 2019-01-07 | End: 2019-01-07 | Stop reason: HOSPADM

## 2019-01-07 RX ORDER — ROPIVACAINE HYDROCHLORIDE 5 MG/ML
INJECTION, SOLUTION EPIDURAL; INFILTRATION; PERINEURAL
Status: COMPLETED | OUTPATIENT
Start: 2019-01-07 | End: 2019-01-07

## 2019-01-07 RX ORDER — LIDOCAINE HYDROCHLORIDE 20 MG/ML
INJECTION, SOLUTION EPIDURAL; INFILTRATION; INTRACAUDAL; PERINEURAL AS NEEDED
Status: DISCONTINUED | OUTPATIENT
Start: 2019-01-07 | End: 2019-01-07 | Stop reason: HOSPADM

## 2019-01-07 RX ORDER — MIDAZOLAM HYDROCHLORIDE 1 MG/ML
INJECTION, SOLUTION INTRAMUSCULAR; INTRAVENOUS
Status: COMPLETED | OUTPATIENT
Start: 2019-01-07 | End: 2019-01-07

## 2019-01-07 RX ORDER — PROPOFOL 10 MG/ML
INJECTION, EMULSION INTRAVENOUS AS NEEDED
Status: DISCONTINUED | OUTPATIENT
Start: 2019-01-07 | End: 2019-01-07 | Stop reason: HOSPADM

## 2019-01-07 RX ORDER — BUPIVACAINE HYDROCHLORIDE AND EPINEPHRINE 5; 5 MG/ML; UG/ML
INJECTION, SOLUTION EPIDURAL; INTRACAUDAL; PERINEURAL AS NEEDED
Status: DISCONTINUED | OUTPATIENT
Start: 2019-01-07 | End: 2019-01-07 | Stop reason: HOSPADM

## 2019-01-07 RX ORDER — SODIUM CHLORIDE, SODIUM LACTATE, POTASSIUM CHLORIDE, CALCIUM CHLORIDE 600; 310; 30; 20 MG/100ML; MG/100ML; MG/100ML; MG/100ML
125 INJECTION, SOLUTION INTRAVENOUS CONTINUOUS
Status: DISCONTINUED | OUTPATIENT
Start: 2019-01-07 | End: 2019-01-07 | Stop reason: HOSPADM

## 2019-01-07 RX ORDER — FENTANYL CITRATE 50 UG/ML
INJECTION, SOLUTION INTRAMUSCULAR; INTRAVENOUS
Status: COMPLETED | OUTPATIENT
Start: 2019-01-07 | End: 2019-01-07

## 2019-01-07 RX ORDER — HYDROMORPHONE HYDROCHLORIDE 2 MG/1
2 TABLET ORAL
Qty: 40 TAB | Refills: 0 | Status: SHIPPED
Start: 2019-01-07 | End: 2019-05-19

## 2019-01-07 RX ORDER — NALOXONE HYDROCHLORIDE 0.4 MG/ML
0.1 INJECTION, SOLUTION INTRAMUSCULAR; INTRAVENOUS; SUBCUTANEOUS AS NEEDED
Status: DISCONTINUED | OUTPATIENT
Start: 2019-01-07 | End: 2019-01-07 | Stop reason: HOSPADM

## 2019-01-07 RX ORDER — MIDAZOLAM HYDROCHLORIDE 1 MG/ML
INJECTION, SOLUTION INTRAMUSCULAR; INTRAVENOUS
Status: DISCONTINUED
Start: 2019-01-07 | End: 2019-01-07 | Stop reason: HOSPADM

## 2019-01-07 RX ORDER — SODIUM CHLORIDE, SODIUM LACTATE, POTASSIUM CHLORIDE, CALCIUM CHLORIDE 600; 310; 30; 20 MG/100ML; MG/100ML; MG/100ML; MG/100ML
1000 INJECTION, SOLUTION INTRAVENOUS CONTINUOUS
Status: DISCONTINUED | OUTPATIENT
Start: 2019-01-07 | End: 2019-01-07 | Stop reason: HOSPADM

## 2019-01-07 RX ORDER — MAGNESIUM SULFATE 100 %
4 CRYSTALS MISCELLANEOUS AS NEEDED
Status: DISCONTINUED | OUTPATIENT
Start: 2019-01-07 | End: 2019-01-07 | Stop reason: HOSPADM

## 2019-01-07 RX ORDER — CEFAZOLIN SODIUM/WATER 2 G/20 ML
2 SYRINGE (ML) INTRAVENOUS ONCE
Status: COMPLETED | OUTPATIENT
Start: 2019-01-07 | End: 2019-01-07

## 2019-01-07 RX ORDER — FENTANYL CITRATE 50 UG/ML
50 INJECTION, SOLUTION INTRAMUSCULAR; INTRAVENOUS
Status: DISCONTINUED | OUTPATIENT
Start: 2019-01-07 | End: 2019-01-07 | Stop reason: HOSPADM

## 2019-01-07 RX ADMIN — SODIUM CHLORIDE, SODIUM LACTATE, POTASSIUM CHLORIDE, AND CALCIUM CHLORIDE 125 ML/HR: 600; 310; 30; 20 INJECTION, SOLUTION INTRAVENOUS at 08:54

## 2019-01-07 RX ADMIN — SODIUM CHLORIDE, SODIUM LACTATE, POTASSIUM CHLORIDE, AND CALCIUM CHLORIDE: 600; 310; 30; 20 INJECTION, SOLUTION INTRAVENOUS at 11:37

## 2019-01-07 RX ADMIN — PROPOFOL 150 MG: 10 INJECTION, EMULSION INTRAVENOUS at 11:12

## 2019-01-07 RX ADMIN — Medication 2 G: at 11:26

## 2019-01-07 RX ADMIN — ONDANSETRON 4 MG: 2 INJECTION INTRAMUSCULAR; INTRAVENOUS at 12:27

## 2019-01-07 RX ADMIN — BUPIVACAINE HYDROCHLORIDE 6 ML/HR: 7.5 INJECTION, SOLUTION EPIDURAL; RETROBULBAR at 12:47

## 2019-01-07 RX ADMIN — MIDAZOLAM HYDROCHLORIDE 2 MG: 1 INJECTION, SOLUTION INTRAMUSCULAR; INTRAVENOUS at 09:53

## 2019-01-07 RX ADMIN — PROPOFOL 50 MG: 10 INJECTION, EMULSION INTRAVENOUS at 11:24

## 2019-01-07 RX ADMIN — FENTANYL CITRATE 100 MCG: 50 INJECTION, SOLUTION INTRAMUSCULAR; INTRAVENOUS at 09:53

## 2019-01-07 RX ADMIN — LIDOCAINE HYDROCHLORIDE 100 MG: 20 INJECTION, SOLUTION EPIDURAL; INFILTRATION; INTRACAUDAL; PERINEURAL at 11:12

## 2019-01-07 RX ADMIN — PROPOFOL 50 MG: 10 INJECTION, EMULSION INTRAVENOUS at 11:27

## 2019-01-07 RX ADMIN — ROPIVACAINE HYDROCHLORIDE 20 ML: 5 INJECTION, SOLUTION EPIDURAL; INFILTRATION; PERINEURAL at 09:53

## 2019-01-07 NOTE — H&P
11/29/18 Patient Name:   Robert Raya Account #:  [de-identified] YOB: 1955 Chief Complaint:  Left shoulder pain. History of Chief Complaint:  She is now 61. She complains of pain in the left shoulder present for several months. She has not had any relief with anti-inflammatories. She saw Dr. Julia Whitney, who told her she had arthritis in the shoulder and gave her a subacromial cortisone injection, which led to no real change in her symptoms other than giving her slightly more motion. She had an MRI done through [facility] which was reviewed, and this study shows 50% thickness tear of the infraspinatus with intrasubstance tearing of the supraspinatus, with tearing of the glenoid labrum, and moderate DJD of the [facility] joint. Past Medical/Surgical History:   
Disease/Disorder Type Date Side Surgery Date Side Comment Angina Arthritis Asthma Depression Diabetes Hyperlipidemia Hypertension Mental/Nerve Disorder Arthroscopy shoulder 01/14/2013 left Arthroscopy knee 09/16/2011 right Spinal fusion, cervical 09/19/2013  [facility] 11/27/2018 - C3-7 Hemorrhoidectomy 2018 Allergies:   
Ingredient Reaction Medication Name Comment ACETAMINOPHEN  Vicodin HYDROCODONE BITARTRATE  Vicodin Current Medications:   
Medication Directions  
metformin 500 mg Tab take 1 tablet (500MG)  by oral route 2 times every day with morning and evening meals  
trazodone 50 mg Tab take 1 tablet (50MG)  by oral route 3 times every day after meals  
valacyclovir 1 g Tab take 1 tablet (1000MG)  by oral route  every 12 hours  
potassium chloride ER 20 mEq Tab, Particles/Crystals   
biotin   
clonidine HCl 0.2 mg tablet take 1 tablet by oral route 2 times every day Fish Oil   
gabapentin 300 mg capsule take 1 capsule by oral route 2 times every day  
ibuprofen 800 mg tablet  as needed lidocaine 5 % topical patch apply 1 patch by transdermal route  every day (May wear up to 12hours.)  
diltiazem  mg 24 hr Tab take 1 tablet (420MG)  by oral route  every day  
aspirin 81 mg Tab take 1 tablet (81MG)  by oral route  every day  
oxycodone 5 mg tablet 1-2 tabs po every 6 hrs prn pain Social History: SMOKING Status Tobacco Type Units Per Day Yrs Used Never smoker ALCOHOL There is no history of alcohol use. Family History:   
Disease Detail Family Member Age Cause of Death Comments Family history of Renal disease   N Heart disease Father  N Alcoholism Father  N Cancer, prostate Father  N Arthritis Mother  N Diabetes mellitus Mother  N Hypertension Mother  N Stroke Mother  N Review of Systems:    Pertinent positives include dizziness, palpitations, ringing in ears, sore throat/hoarseness, weight change, anxiety, joint pain, nausea/vomiting and numbness/tingling. Pertinent negatives include blurred vision, chest pain, chills, cold, discharge of the eyes, double vision, fever, headache, hearing loss, heart murmur, itching of the eyes, redness of the eyes, rheumatic fever, abdominal pain, bipolar disorder, bladder/kidney infection, bloody stool, blood in urine, burning sensation, changes in mood, chronic cough, depression, diarrhea, difficulty breathing, difficulty swallowing, fainting, frequent urinating, fracture/dislocation, gas/bloating, gout, hemorrhoids, incontinence, joint stiffness, loss of balance, memory loss, muscle weakness, pain on breathing, painful urination, psoriasis, rash/itching, Raynaud's phenomenon, rheumatoid disease, seizure disorder, shortness of breath, sprain/strain, swelling of feet, tendonitis, varicose veins and wheezing. Vitals: 
Date BP Pulse Temp (F) Resp. (per min.) Height (Total in.) Weight (lbs.) BMI  
11/29/2018     64.00 182.00 31.24  
08/27/2013 141/83 97  18 64.00  32.27 Physical Examination: General:  Patient in no acute distress. Vital Signs: See database for vital signs. HEENT: Normal. 
Neck: Supple. Chest: Clear. Heart: Regular sinus rhythm. Abdomen: Soft, nontender, no adenopathy. Neurologic: Normal exam. 
Extremities:    Physical exam of the left shoulder shows reasonably good motion, but she has significant pain with thumb -down abduction. She has good strength in external rotation and abduction. Radiograph Examination:  AP, scapular, and axillary views of the left shoulder were obtained and interpreted in the office today and reveal degenerative changes of the [facility] joint, minimal hooking of the anterior acromion, with good preservation of the glenohumeral space. Impression:  Left shoulder impingement with partial rotator cuff tear. Plan:  She will be scheduled for left shoulder arthroscopic evaluation and probable rotator cuff repair. She understands the risks and benefits of the procedure, and she is ready to proceed. She was given a sling. Postop, she will get Dilaudid.

## 2019-01-07 NOTE — ANESTHESIA PROCEDURE NOTES
Peripheral Block Start time: 1/7/2019 9:53 AM 
Performed by: Laci Hazel MD 
Authorized by: Laci Hazel MD  
 
 
Pre-procedure: Indications: at surgeon's request and post-op pain management Preanesthetic Checklist: patient identified, risks and benefits discussed, site marked, timeout performed, anesthesia consent given and patient being monitored Block Type:  
Block Type: Interscalene Laterality:  Left Monitoring:  Standard ASA monitoring, continuous pulse ox, frequent vital sign checks, heart rate, responsive to questions and oxygen Injection Technique:  Continuous Procedures: ultrasound guided Patient Position: seated Prep: chlorhexidine Location:  Interscalene Needle Type:  Stimuplex Needle Gauge:  22 G Needle Localization:  Anatomical landmarks and ultrasound guidance Assessment: 
Number of attempts:  1 Injection Assessment:  Incremental injection every 5 mL, local visualized surrounding nerve on ultrasound, negative aspiration for CSF, negative aspiration for blood, no paresthesia, no intravascular symptoms and ultrasound image on chart Patient tolerance:  Patient tolerated the procedure well with no immediate complications 18G Pajunk needle and catheter

## 2019-01-07 NOTE — INTERVAL H&P NOTE
H&P Update: 
Juma Macdonald was seen and examined. History and physical has been reviewed. The patient has been examined. There have been no significant clinical changes since the completion of the originally dated History and Physical. 
Patient identified by surgeon; surgical site was confirmed by patient and surgeon.  
 
Signed By: Awilda De La O MD   
 January 7, 2019 10:46 AM

## 2019-01-07 NOTE — OP NOTES
PREOPERATIVE DIAGNOSES:    1. Rotator cuff tear, left shoulder  2. Impingement. 3. Degenerative arthritis of the acromioclavicular joint. POSTOPERATIVE DIAGNOSES:    1. Rotator cuff tear, left shoulder  2. Impingement. 3. Degenerative arthritis of the acromioclavicular joint. PROCEDURES PERFORMED:    1. Arthroscopic decompression. 2. Resection distal clavicle. 3. Rotator cuff repair, left shoulder. SURGEON:  Hemal Pagan. Santos Rios MD    ANESTHESIOLOGIST:  Anesthesiologist: Raúl Sargent MD  CRNA: Simon Sanchez CRNA    ANESTHESIA:   General anesthesia after scalene block. COMPLICATIONS:  None. BLOOD LOSS:  Minimal.      DESCRIPTION OF PROCEDURE:  This 61y.o.-year-old female, with a history of persistent pain in the left shoulder, unresponsive to conservative care. The patient had a MRI showing the above noted findings and was then scheduled for surgery. PROCEDURE:  The patient was taken to the operating room and given general anesthesia after a scalene block. When it was adequate, the leftshoulder was examined and showed full motion with no gross instability. The patient was placed in a right lateral decubitus position. The left shoulder was placed in traction, prepped and draped in a normal manner and injected with 30 mL of 1% Marcaine with Epinephrine. Anterior and posterior stab wounds were made, and a standard arthroscopic examination was performed. This revealed a tear of the supraspinatus and the undersurface of the tear was debrided until all of the nonviable tissue was removed. The biceps and the superior labrum were previously torn and the articular surfaces of the joint appeared normal.  The instruments were then redirected in the subacromial space. A lateral portal was established and a limited bursectomy was carried out. The full thickness nature of the rotator cuff tear was confirmed.  The cuff was mobilized and could be brought back down into anatomic position without difficulty. The soft tissue was removed from the anterior acromion and distal clavicle, including the coracoacromial ligament. There was a sharp hooked appearance on the anterior acromion, and severe arthritic changes of the [facility] joint with complete loss of joint cartilage, and large inferior osteophytes. A high-speed bur was used to perform an acromioplasty. The same level of resection was carried across the distal clavicle. The arthroscope was switched to the lateral portal to assure that adequate bone removal had been achieved, and the result was a flat acromion. The [facility] joint was approached directly through the anterior portal.  The most medial few millimeters of the acromion and the distal centimeter of the clavicle were removed arthroscopically. Bleeding points were treated with the electrocautery wand for hemostasis. The original footprint was cleaned of soft tissue and a high-speed bur was used to create a bleeding surface. A single row Ultra-Tape technique was used for the repair. Two Ultra-Tape sutures were passed through the edge of the cuff in a vertical mattress technique. They were then secured at the footprint with two Multifix S 5.5mm PEEK anchors resulting in a solid repair. The instruments were removed. The pain pump catheter was left in the subacromial space. The wounds were closed using 3-0 nylon suture. A sterile compressive dressing was applied, along with a sling. The patient left the operating room in satisfactory condition.

## 2019-01-07 NOTE — DISCHARGE INSTRUCTIONS
Follow the preprinted discharge instruction sheet from Dr. Leo Diaz office  Contact Dr. Leo Diaz office with any Post questions or concerns at [phone]  Ice and elevation   Regular diet  Drink plenty of fluids  Take prescription as directed  Ambulate multiple times daily    Follow up with Dr. Gopi Reynolds in 1 week      In 3 days - before removing catheter  - clamp tubing  4 hours prior to removal  Follow preprinted instruction sheet        DISCHARGE SUMMARY from Nurse    PATIENT INSTRUCTIONS:    After general anesthesia or intravenous sedation, for 24 hours or while taking prescription Narcotics:  · Limit your activities  · Do not drive and operate hazardous machinery  · Do not make important personal or business decisions  · Do  not drink alcoholic beverages  · If you have not urinated within 8 hours after discharge, please contact your surgeon on call. Report the following to your surgeon:  · Excessive pain, swelling, redness or odor of or around the surgical area  · Temperature over 100.5  · Nausea and vomiting lasting longer than 4 hours or if unable to take medications  · Any signs of decreased circulation or nerve impairment to extremity: change in color, persistent  numbness, tingling, coldness or increase pain  · Any questions    What to do at Home:  Recommended activity: Ambulate in house and No lifting, Driving, or Strenuous exercise until advised,     If you experience any of the following symptoms fever, chills, uncontrollable pain , active bleeding or drainage, circulation changes, please follow up with Dr. Gopi Reynolds. *  Please give a list of your current medications to your Primary Care Provider. *  Please update this list whenever your medications are discontinued, doses are      changed, or new medications (including over-the-counter products) are added. *  Please carry medication information at all times in case of emergency situations.     These are general instructions for a healthy lifestyle:    No smoking/ No tobacco products/ Avoid exposure to second hand smoke  Surgeon General's Warning:  Quitting smoking now greatly reduces serious risk to your health. Obesity, smoking, and sedentary lifestyle greatly increases your risk for illness    A healthy diet, regular physical exercise & weight monitoring are important for maintaining a healthy lifestyle    You may be retaining fluid if you have a history of heart failure or if you experience any of the following symptoms:  Weight gain of 3 pounds or more overnight or 5 pounds in a week, increased swelling in our hands or feet or shortness of breath while lying flat in bed. Please call your doctor as soon as you notice any of these symptoms; do not wait until your next office visit. Recognize signs and symptoms of STROKE:    F-face looks uneven    A-arms unable to move or move unevenly    S-speech slurred or non-existent    T-time-call 911 as soon as signs and symptoms begin-DO NOT go       Back to bed or wait to see if you get better-TIME IS BRAIN. Warning Signs of HEART ATTACK     Call 911 if you have these symptoms:   Chest discomfort. Most heart attacks involve discomfort in the center of the chest that lasts more than a few minutes, or that goes away and comes back. It can feel like uncomfortable pressure, squeezing, fullness, or pain.  Discomfort in other areas of the upper body. Symptoms can include pain or discomfort in one or both arms, the back, neck, jaw, or stomach.  Shortness of breath with or without chest discomfort.  Other signs may include breaking out in a cold sweat, nausea, or lightheadedness. Don't wait more than five minutes to call 911 - MINUTES MATTER! Fast action can save your life. Calling 911 is almost always the fastest way to get lifesaving treatment. Emergency Medical Services staff can begin treatment when they arrive -- up to an hour sooner than if someone gets to the hospital by car.      Patient armband removed and shredded    The discharge information has been reviewed with the patient and caregiver. The patient and caregiver verbalized understanding. Discharge medications reviewed with the patient and caregiver and appropriate educational materials and side effects teaching were provided.   ___________________________________________________________________________________________________________________________________

## 2019-01-07 NOTE — ANESTHESIA PREPROCEDURE EVALUATION
Anesthetic History No history of anesthetic complications Review of Systems / Medical History Patient summary reviewed, nursing notes reviewed and pertinent labs reviewed Pulmonary Sleep apnea: CPAP Neuro/Psych Psychiatric history Cardiovascular Hypertension: well controlled Dysrhythmias Exercise tolerance: >4 METS 
  
GI/Hepatic/Renal 
  
GERD Endo/Other Diabetes: well controlled Arthritis Other Findings Physical Exam 
 
Airway Mallampati: II 
TM Distance: < 4 cm Neck ROM: normal range of motion Mouth opening: Normal 
 
 Cardiovascular Rhythm: regular Rate: normal 
 
 
 
 Dental 
No notable dental hx 
 
Comments: Broken back right tooth Pulmonary Breath sounds clear to auscultation Abdominal 
GI exam deferred Other Findings Anesthetic Plan ASA: 3 Anesthesia type: general and regional - interscalene block Post-op pain plan if not by surgeon: peripheral nerve block continuous Anesthetic plan and risks discussed with: Patient Risk of a block include nerve injury, bleeding, infection, and failure as the most common ones although they rare.

## 2019-01-07 NOTE — PERIOP NOTES
System wide issue with printing from [employer] - Discharge instructions copied and pasted to wor document and given to pt and family

## 2019-01-07 NOTE — ANESTHESIA POSTPROCEDURE EVALUATION
Procedure(s): LEFT SHOULDER ARTHROSCOPIC DECOMPRESSION, RESECTION DISTAL CLAVICLE, ROTATOR CUFF REPAIR GEN WITH SCALENE BLOCK PRN. Anesthesia Post Evaluation Comments: Post-Anesthesia Evaluation and Assessment Cardiovascular Function/Vital Signs /61   Pulse 77   Temp 36.6 °C (97.9 °F)   Resp 17   Ht 5' 4\" (1.626 m)   Wt 81 kg (178 lb 9 oz)   SpO2 98%   BMI 30.65 kg/m² Patient is status post Procedure(s): LEFT SHOULDER ARTHROSCOPIC DECOMPRESSION, RESECTION DISTAL CLAVICLE, ROTATOR CUFF REPAIR GEN WITH SCALENE BLOCK PRN. Nausea/Vomiting: Controlled. Postoperative hydration reviewed and adequate. Pain: 
Pain Scale 1: Numeric (0 - 10) (01/07/19 1308) Pain Intensity 1: 0 (01/07/19 1308) Managed. Neurological Status:  
Neuro (WDL): Within Defined Limits (01/07/19 0836) At baseline. Mental Status and Level of Consciousness: Arousable. Pulmonary Status:  
O2 Device: Nasal cannula (01/07/19 1308) Adequate oxygenation and airway patent. Complications related to anesthesia: None Post-anesthesia assessment completed. No concerns. Patient has met all discharge requirements. Signed By: Karthik Grimaldo MD  
 January 7, 2019 Visit Vitals /61 Pulse 77 Temp 36.6 °C (97.9 °F) Resp 17 Ht 5' 4\" (1.626 m) Wt 81 kg (178 lb 9 oz) SpO2 98% BMI 30.65 kg/m²

## 2019-01-07 NOTE — PERIOP NOTES
Discharge instructions completed - opportunity for questions - instructed on OnQ catheter removal - family verbalizes understanding- VS med list reviewed

## 2019-05-14 LAB
ATRIAL RATE: 105 BPM
CALCULATED P AXIS, ECG09: 37 DEGREES
CALCULATED R AXIS, ECG10: -12 DEGREES
CALCULATED T AXIS, ECG11: 20 DEGREES
DIAGNOSIS, 93000: NORMAL
P-R INTERVAL, ECG05: 160 MS
Q-T INTERVAL, ECG07: 336 MS
QRS DURATION, ECG06: 82 MS
QTC CALCULATION (BEZET), ECG08: 444 MS
VENTRICULAR RATE, ECG03: 105 BPM

## 2019-05-19 ENCOUNTER — HOSPITAL ENCOUNTER (INPATIENT)
Age: 64
LOS: 3 days | Discharge: HOME | DRG: 247 | End: 2019-05-22
Attending: EMERGENCY MEDICINE | Admitting: FAMILY MEDICINE
Payer: MEDICARE

## 2019-05-19 ENCOUNTER — APPOINTMENT (OUTPATIENT)
Dept: GENERAL RADIOLOGY | Age: 64
DRG: 247 | End: 2019-05-19
Attending: EMERGENCY MEDICINE
Payer: MEDICARE

## 2019-05-19 DIAGNOSIS — I25.118 CORONARY ARTERY DISEASE OF NATIVE ARTERY OF NATIVE HEART WITH STABLE ANGINA PECTORIS (HCC): ICD-10-CM

## 2019-05-19 DIAGNOSIS — E78.00 ELEVATED CHOLESTEROL: Chronic | ICD-10-CM

## 2019-05-19 DIAGNOSIS — E11.8 TYPE 2 DIABETES MELLITUS WITH COMPLICATION, WITHOUT LONG-TERM CURRENT USE OF INSULIN (HCC): ICD-10-CM

## 2019-05-19 DIAGNOSIS — I21.4 NSTEMI (NON-ST ELEVATED MYOCARDIAL INFARCTION) (HCC): ICD-10-CM

## 2019-05-19 DIAGNOSIS — R07.9 ACUTE CHEST PAIN: Primary | ICD-10-CM

## 2019-05-19 PROBLEM — E11.9 DIABETES (HCC): Status: ACTIVE | Noted: 2019-05-19

## 2019-05-19 LAB
ALBUMIN SERPL-MCNC: 3.5 G/DL (ref 3.4–5)
ALBUMIN/GLOB SERPL: 1 {RATIO} (ref 0.8–1.7)
ALP SERPL-CCNC: 128 U/L (ref 45–117)
ALT SERPL-CCNC: 15 U/L (ref 13–56)
ANION GAP SERPL CALC-SCNC: 11 MMOL/L (ref 3–18)
APPEARANCE UR: CLEAR
AST SERPL-CCNC: 12 U/L (ref 15–37)
ATRIAL RATE: 70 BPM
ATRIAL RATE: 71 BPM
BASOPHILS # BLD: 0 K/UL (ref 0–0.1)
BASOPHILS NFR BLD: 0 % (ref 0–2)
BILIRUB SERPL-MCNC: 0.2 MG/DL (ref 0.2–1)
BILIRUB UR QL: NEGATIVE
BUN SERPL-MCNC: 12 MG/DL (ref 7–18)
BUN/CREAT SERPL: 12 (ref 12–20)
CALCIUM SERPL-MCNC: 9.7 MG/DL (ref 8.5–10.1)
CALCULATED P AXIS, ECG09: 49 DEGREES
CALCULATED P AXIS, ECG09: 55 DEGREES
CALCULATED R AXIS, ECG10: -11 DEGREES
CALCULATED R AXIS, ECG10: 12 DEGREES
CALCULATED T AXIS, ECG11: -71 DEGREES
CALCULATED T AXIS, ECG11: 14 DEGREES
CHLORIDE SERPL-SCNC: 109 MMOL/L (ref 100–108)
CK MB CFR SERPL CALC: ABNORMAL % (ref 0–4)
CK MB CFR SERPL CALC: NORMAL % (ref 0–4)
CK MB SERPL-MCNC: <1 NG/ML (ref 5–25)
CK SERPL-CCNC: 62 U/L (ref 26–192)
CK SERPL-CCNC: 66 U/L (ref 26–192)
CK SERPL-CCNC: 68 U/L (ref 26–192)
CK SERPL-CCNC: 70 U/L (ref 26–192)
CO2 SERPL-SCNC: 23 MMOL/L (ref 21–32)
COLOR UR: YELLOW
CREAT SERPL-MCNC: 1.02 MG/DL (ref 0.6–1.3)
DIAGNOSIS, 93000: NORMAL
DIAGNOSIS, 93000: NORMAL
DIFFERENTIAL METHOD BLD: ABNORMAL
EOSINOPHIL # BLD: 0.5 K/UL (ref 0–0.4)
EOSINOPHIL NFR BLD: 4 % (ref 0–5)
EPITH CASTS URNS QL MICRO: NORMAL /LPF (ref 0–5)
ERYTHROCYTE [DISTWIDTH] IN BLOOD BY AUTOMATED COUNT: 16.9 % (ref 11.6–14.5)
GLOBULIN SER CALC-MCNC: 3.5 G/DL (ref 2–4)
GLUCOSE BLD STRIP.AUTO-MCNC: 101 MG/DL (ref 70–110)
GLUCOSE BLD STRIP.AUTO-MCNC: 104 MG/DL (ref 70–110)
GLUCOSE BLD STRIP.AUTO-MCNC: 134 MG/DL (ref 70–110)
GLUCOSE BLD STRIP.AUTO-MCNC: 138 MG/DL (ref 70–110)
GLUCOSE SERPL-MCNC: 159 MG/DL (ref 74–99)
GLUCOSE UR STRIP.AUTO-MCNC: NEGATIVE MG/DL
HCT VFR BLD AUTO: 35.4 % (ref 35–45)
HGB BLD-MCNC: 11 G/DL (ref 12–16)
HGB UR QL STRIP: NEGATIVE
KETONES UR QL STRIP.AUTO: NEGATIVE MG/DL
LEUKOCYTE ESTERASE UR QL STRIP.AUTO: ABNORMAL
LYMPHOCYTES # BLD: 4.2 K/UL (ref 0.9–3.6)
LYMPHOCYTES NFR BLD: 28 % (ref 21–52)
MCH RBC QN AUTO: 22.3 PG (ref 24–34)
MCHC RBC AUTO-ENTMCNC: 31.1 G/DL (ref 31–37)
MCV RBC AUTO: 71.8 FL (ref 74–97)
MONOCYTES # BLD: 1.1 K/UL (ref 0.05–1.2)
MONOCYTES NFR BLD: 7 % (ref 3–10)
NEUTS SEG # BLD: 9.2 K/UL (ref 1.8–8)
NEUTS SEG NFR BLD: 61 % (ref 40–73)
NITRITE UR QL STRIP.AUTO: NEGATIVE
P-R INTERVAL, ECG05: 186 MS
P-R INTERVAL, ECG05: 220 MS
PH UR STRIP: 6.5 [PH] (ref 5–8)
PLATELET # BLD AUTO: 471 K/UL (ref 135–420)
PMV BLD AUTO: 8.4 FL (ref 9.2–11.8)
POTASSIUM SERPL-SCNC: 3.4 MMOL/L (ref 3.5–5.5)
PROT SERPL-MCNC: 7 G/DL (ref 6.4–8.2)
PROT UR STRIP-MCNC: NEGATIVE MG/DL
Q-T INTERVAL, ECG07: 390 MS
Q-T INTERVAL, ECG07: 406 MS
QRS DURATION, ECG06: 80 MS
QRS DURATION, ECG06: 80 MS
QTC CALCULATION (BEZET), ECG08: 423 MS
QTC CALCULATION (BEZET), ECG08: 438 MS
RBC # BLD AUTO: 4.93 M/UL (ref 4.2–5.3)
SODIUM SERPL-SCNC: 143 MMOL/L (ref 136–145)
SP GR UR REFRACTOMETRY: 1.01 (ref 1–1.03)
TROPONIN I SERPL-MCNC: 0.02 NG/ML (ref 0–0.04)
TROPONIN I SERPL-MCNC: 0.07 NG/ML (ref 0–0.04)
TROPONIN I SERPL-MCNC: 0.11 NG/ML (ref 0–0.04)
TROPONIN I SERPL-MCNC: 0.11 NG/ML (ref 0–0.04)
TROPONIN I SERPL-MCNC: 0.13 NG/ML (ref 0–0.04)
UROBILINOGEN UR QL STRIP.AUTO: 0.2 EU/DL (ref 0.2–1)
VENTRICULAR RATE, ECG03: 70 BPM
VENTRICULAR RATE, ECG03: 71 BPM
WBC # BLD AUTO: 15 K/UL (ref 4.6–13.2)
WBC URNS QL MICRO: NORMAL /HPF (ref 0–5)

## 2019-05-19 PROCEDURE — 74011250636 HC RX REV CODE- 250/636: Performed by: EMERGENCY MEDICINE

## 2019-05-19 PROCEDURE — 74011000250 HC RX REV CODE- 250: Performed by: EMERGENCY MEDICINE

## 2019-05-19 PROCEDURE — 82962 GLUCOSE BLOOD TEST: CPT

## 2019-05-19 PROCEDURE — 4A023N7 MEASUREMENT OF CARDIAC SAMPLING AND PRESSURE, LEFT HEART, PERCUTANEOUS APPROACH: ICD-10-PCS | Performed by: INTERNAL MEDICINE

## 2019-05-19 PROCEDURE — 74011250637 HC RX REV CODE- 250/637: Performed by: INTERNAL MEDICINE

## 2019-05-19 PROCEDURE — 74011250637 HC RX REV CODE- 250/637: Performed by: EMERGENCY MEDICINE

## 2019-05-19 PROCEDURE — 93005 ELECTROCARDIOGRAM TRACING: CPT

## 2019-05-19 PROCEDURE — B2111ZZ FLUOROSCOPY OF MULTIPLE CORONARY ARTERIES USING LOW OSMOLAR CONTRAST: ICD-10-PCS | Performed by: INTERNAL MEDICINE

## 2019-05-19 PROCEDURE — 65660000000 HC RM CCU STEPDOWN

## 2019-05-19 PROCEDURE — 74011250636 HC RX REV CODE- 250/636: Performed by: FAMILY MEDICINE

## 2019-05-19 PROCEDURE — 74011250637 HC RX REV CODE- 250/637: Performed by: FAMILY MEDICINE

## 2019-05-19 PROCEDURE — 027034Z DILATION OF CORONARY ARTERY, ONE ARTERY WITH DRUG-ELUTING INTRALUMINAL DEVICE, PERCUTANEOUS APPROACH: ICD-10-PCS | Performed by: INTERNAL MEDICINE

## 2019-05-19 PROCEDURE — 96374 THER/PROPH/DIAG INJ IV PUSH: CPT

## 2019-05-19 PROCEDURE — 81001 URINALYSIS AUTO W/SCOPE: CPT

## 2019-05-19 PROCEDURE — 82550 ASSAY OF CK (CPK): CPT

## 2019-05-19 PROCEDURE — 36415 COLL VENOUS BLD VENIPUNCTURE: CPT

## 2019-05-19 PROCEDURE — 99285 EMERGENCY DEPT VISIT HI MDM: CPT

## 2019-05-19 PROCEDURE — 80053 COMPREHEN METABOLIC PANEL: CPT

## 2019-05-19 PROCEDURE — 71045 X-RAY EXAM CHEST 1 VIEW: CPT

## 2019-05-19 PROCEDURE — 85025 COMPLETE CBC W/AUTO DIFF WBC: CPT

## 2019-05-19 RX ORDER — VALACYCLOVIR HYDROCHLORIDE 500 MG/1
500 TABLET, FILM COATED ORAL DAILY
Status: DISCONTINUED | OUTPATIENT
Start: 2019-05-19 | End: 2019-05-22 | Stop reason: HOSPADM

## 2019-05-19 RX ORDER — INSULIN LISPRO 100 [IU]/ML
INJECTION, SOLUTION INTRAVENOUS; SUBCUTANEOUS EVERY 4 HOURS
Status: DISCONTINUED | OUTPATIENT
Start: 2019-05-19 | End: 2019-05-20

## 2019-05-19 RX ORDER — ONDANSETRON 4 MG/1
4 TABLET, ORALLY DISINTEGRATING ORAL
Status: DISCONTINUED | OUTPATIENT
Start: 2019-05-19 | End: 2019-05-20

## 2019-05-19 RX ORDER — HEPARIN SODIUM 5000 [USP'U]/ML
5000 INJECTION, SOLUTION INTRAVENOUS; SUBCUTANEOUS EVERY 8 HOURS
Status: DISCONTINUED | OUTPATIENT
Start: 2019-05-19 | End: 2019-05-22 | Stop reason: HOSPADM

## 2019-05-19 RX ORDER — NITROGLYCERIN 0.4 MG/1
0.4 TABLET SUBLINGUAL
Status: COMPLETED | OUTPATIENT
Start: 2019-05-19 | End: 2019-05-19

## 2019-05-19 RX ORDER — CLONIDINE HYDROCHLORIDE 0.1 MG/1
0.2 TABLET ORAL 2 TIMES DAILY
Status: DISCONTINUED | OUTPATIENT
Start: 2019-05-19 | End: 2019-05-22 | Stop reason: HOSPADM

## 2019-05-19 RX ORDER — NITROGLYCERIN 0.4 MG/1
TABLET SUBLINGUAL
Status: DISPENSED
Start: 2019-05-19 | End: 2019-05-19

## 2019-05-19 RX ORDER — RANITIDINE 150 MG/1
150 TABLET, FILM COATED ORAL 2 TIMES DAILY
Status: DISCONTINUED | OUTPATIENT
Start: 2019-05-19 | End: 2019-05-22 | Stop reason: HOSPADM

## 2019-05-19 RX ORDER — FAMOTIDINE 10 MG/ML
20 INJECTION INTRAVENOUS
Status: COMPLETED | OUTPATIENT
Start: 2019-05-19 | End: 2019-05-19

## 2019-05-19 RX ORDER — NITROGLYCERIN 0.4 MG/1
0.4 TABLET SUBLINGUAL AS NEEDED
Status: DISCONTINUED | OUTPATIENT
Start: 2019-05-19 | End: 2019-05-19 | Stop reason: SDUPTHER

## 2019-05-19 RX ORDER — GABAPENTIN 300 MG/1
300 CAPSULE ORAL 2 TIMES DAILY
Status: DISCONTINUED | OUTPATIENT
Start: 2019-05-19 | End: 2019-05-22 | Stop reason: HOSPADM

## 2019-05-19 RX ORDER — ATORVASTATIN CALCIUM 20 MG/1
20 TABLET, FILM COATED ORAL
Status: DISCONTINUED | OUTPATIENT
Start: 2019-05-19 | End: 2019-05-20

## 2019-05-19 RX ORDER — DEXTROSE 50 % IN WATER (D50W) INTRAVENOUS SYRINGE
25-50 AS NEEDED
Status: DISCONTINUED | OUTPATIENT
Start: 2019-05-19 | End: 2019-05-22 | Stop reason: HOSPADM

## 2019-05-19 RX ORDER — LANOLIN ALCOHOL/MO/W.PET/CERES
1 CREAM (GRAM) TOPICAL
Status: DISCONTINUED | OUTPATIENT
Start: 2019-05-19 | End: 2019-05-22 | Stop reason: HOSPADM

## 2019-05-19 RX ORDER — MAGNESIUM SULFATE 100 %
16 CRYSTALS MISCELLANEOUS AS NEEDED
Status: DISCONTINUED | OUTPATIENT
Start: 2019-05-19 | End: 2019-05-22 | Stop reason: HOSPADM

## 2019-05-19 RX ORDER — GUAIFENESIN 100 MG/5ML
162 LIQUID (ML) ORAL
Status: DISCONTINUED | OUTPATIENT
Start: 2019-05-19 | End: 2019-05-19

## 2019-05-19 RX ORDER — NITROGLYCERIN 0.4 MG/1
0.4 TABLET SUBLINGUAL AS NEEDED
Status: DISCONTINUED | OUTPATIENT
Start: 2019-05-19 | End: 2019-05-22 | Stop reason: HOSPADM

## 2019-05-19 RX ORDER — POTASSIUM CHLORIDE 20 MEQ/1
20 TABLET, EXTENDED RELEASE ORAL EVERY OTHER DAY
Status: DISCONTINUED | OUTPATIENT
Start: 2019-05-19 | End: 2019-05-22 | Stop reason: HOSPADM

## 2019-05-19 RX ADMIN — Medication 1 CAPSULE: at 10:47

## 2019-05-19 RX ADMIN — HEPARIN SODIUM 5000 UNITS: 5000 INJECTION INTRAVENOUS; SUBCUTANEOUS at 17:48

## 2019-05-19 RX ADMIN — DILTIAZEM HYDROCHLORIDE 420 MG: 240 CAPSULE, COATED, EXTENDED RELEASE ORAL at 10:52

## 2019-05-19 RX ADMIN — GABAPENTIN 300 MG: 300 CAPSULE ORAL at 22:06

## 2019-05-19 RX ADMIN — VALACYCLOVIR HYDROCHLORIDE 500 MG: 500 TABLET, FILM COATED ORAL at 10:47

## 2019-05-19 RX ADMIN — NITROGLYCERIN 0.4 MG: 0.4 TABLET, ORALLY DISINTEGRATING SUBLINGUAL at 06:17

## 2019-05-19 RX ADMIN — LIDOCAINE HYDROCHLORIDE 50 ML: 20 SOLUTION ORAL; TOPICAL at 04:37

## 2019-05-19 RX ADMIN — NITROGLYCERIN 0.4 MG: 0.4 TABLET, ORALLY DISINTEGRATING SUBLINGUAL at 18:12

## 2019-05-19 RX ADMIN — NITROGLYCERIN 0.4 MG: 0.4 TABLET, ORALLY DISINTEGRATING SUBLINGUAL at 16:45

## 2019-05-19 RX ADMIN — NITROGLYCERIN 0.4 MG: 0.4 TABLET, ORALLY DISINTEGRATING SUBLINGUAL at 16:39

## 2019-05-19 RX ADMIN — CLONIDINE HYDROCHLORIDE 0.2 MG: 0.1 TABLET ORAL at 22:06

## 2019-05-19 RX ADMIN — FAMOTIDINE 20 MG: 10 INJECTION, SOLUTION INTRAVENOUS at 04:37

## 2019-05-19 RX ADMIN — FERROUS SULFATE TAB 325 MG (65 MG ELEMENTAL FE) 325 MG: 325 (65 FE) TAB at 07:57

## 2019-05-19 RX ADMIN — RANITIDINE 150 MG: 150 TABLET ORAL at 22:06

## 2019-05-19 RX ADMIN — CLONIDINE HYDROCHLORIDE 0.2 MG: 0.1 TABLET ORAL at 10:47

## 2019-05-19 RX ADMIN — POTASSIUM CHLORIDE 20 MEQ: 20 TABLET, EXTENDED RELEASE ORAL at 07:57

## 2019-05-19 RX ADMIN — GABAPENTIN 300 MG: 300 CAPSULE ORAL at 10:47

## 2019-05-19 NOTE — PROGRESS NOTES
3267:  Patient arrived to unit via Wheelchair accompanied by ED RN CORAZON Tobar. Patient ambulatory form WC to bed with steady gait and no issues. Awaiting MD orders at this time. 0730: Bedside and Verbal shift change report given to Harleen Roldan RN (oncoming nurse) by CURT Tomlinson RN (offgoing nurse). Report included the following information SBAR, Kardex, Intake/Output, MAR and Recent Results.

## 2019-05-19 NOTE — ROUTINE PROCESS
Bedside and Verbal shift change report given to KP Ohara (oncoming nurse) by Allyssa Miller (offgoing nurse). Report included the following information SBAR, Kardex, Procedure Summary, Intake/Output, MAR, Accordion and Recent Results.

## 2019-05-19 NOTE — ED NOTES
Pt hourly rounding competed. Safety Pt (X) resting on stretcher with side rails up and call bell in reach. () in chair 
  () in parents arms. Toileting Pt offered ()Bedpan 
   ()Assistance to Restroom 
   ()Urinal 
Ongoing UpdatesUpdated on plan of care and status of test results. Pain Management Inquired as to comfort and offered comfort measures: 
  () warm blankets (X) dimmed lights

## 2019-05-19 NOTE — ROUTINE PROCESS
TRANSFER - IN REPORT: 
 
Verbal report received from CORAZON Tobar RN(name) on Fina Keith  being received from ED(unit) for routine progression of care Report consisted of patients Situation, Background, Assessment and  
Recommendations(SBAR). Information from the following report(s) SBAR, Kardex, ED Summary, Intake/Output, MAR and Recent Results was reviewed with the receiving nurse. Opportunity for questions and clarification was provided.

## 2019-05-19 NOTE — ED PROVIDER NOTES
EMERGENCY DEPARTMENT HISTORY AND PHYSICAL EXAM 
 
Date: 5/19/2019 Patient Name: Mayi Rendon History of Presenting Illness Chief Complaint Patient presents with  Chest Pain History Provided By: Patient Chief Complaint: CP 
Duration: Few Hours Timing:  Acute Location: Central radiating to back Quality: Jack Trevizo Severity: Mild Modifying Factors: No modifying factors Associated Symptoms: SOB, diaphoresis Additional History (Context):  
3:35 AM 
Mayi Rendon is a 59 y.o. female with PMHX of Neuropathy, Hypercholesterolemia, Arrhythmia, GERD, HTN, DM, OA, who presents via EMS to the emergency department C/O central CP radiating to her back onset few hours ago. Associated sxs include SOB, diaphoresis. Pt states that she got up to use the restroom when the pain started. Pt states that the pain lasted for 30 minutes. Pt denies fever, chills, tobacco use, EtOH use, illicit drug use, and any other sxs or complaints. PCP: Whit Ayala MD 
 
Current Outpatient Medications Medication Sig Dispense Refill  
 HYDROmorphone (DILAUDID) 2 mg tablet Take 1 Tab by mouth every four (4) hours as needed for Pain. Max Daily Amount: 12 mg. 40 Tab 0  
 ondansetron (ZOFRAN ODT) 4 mg disintegrating tablet Take 1 Tab by mouth every eight (8) hours as needed for Nausea. 20 Tab 0  promethazine (PHENERGAN) 25 mg tablet Take 1 Tab by mouth every six (6) hours as needed. 12 Tab 0  cloNIDine HCl (CATAPRES) 0.2 mg tablet Take 0.2 mg by mouth two (2) times a day.  Omega-3 Fatty Acids (FISH OIL) 500 mg cap Take  by mouth daily.  ferrous sulfate (IRON) 325 mg (65 mg iron) tablet Take  by mouth Daily (before breakfast).  lidocaine-kinesiology tape 5 % kit Apply  to affected area as needed.  traMADol (ULTRAM) 50 mg tablet Take 50 mg by mouth every six (6) hours as needed for Pain.  valACYclovir (VALTREX) 1 gram tablet Take  by mouth daily.  diltiazem LA (CARDIZEM LA) 420 mg ER tablet Take 420 mg by mouth daily.  metFORMIN (GLUCOPHAGE) 500 mg tablet Take 500 mg by mouth two (2) times daily (with meals).  aspirin delayed-release 81 mg tablet Take  by mouth daily.  gabapentin (NEURONTIN) 100 mg capsule Take 300 mg by mouth two (2) times a day.  potassium chloride (K-DUR, KLOR-CON) 20 mEq tablet Take 20 mEq by mouth every other day. Past History Past Medical History: 
Past Medical History:  
Diagnosis Date  Arrhythmia 1990's SVT- from low potassium  Chronic pain   
 neck, back, left shoulder  Diabetes [facility]) Summer 2018  GERD (gastroesophageal reflux disease)  Hypercholesterolemia   
 taken off medication 1990's  Hypertension late 1970's  Insomnia  Menopause  Neuropathy  OA (osteoarthritis)  Psychiatric disorder   
 depression, anxiety  Sleep apnea   
 uses CPAP Past Surgical History: 
Past Surgical History:  
Procedure Laterality Date  HX BREAST BIOPSY    
 right  HX CERVICAL FUSION  2012  
 anterior  HX GI  2017  
 colonoscopy  HX HYSTERECTOMY  1991  
 HX KNEE ARTHROSCOPY  2011  
 right  HX OTHER SURGICAL    
 hemorrhoidectomy  HX SHOULDER ARTHROSCOPY    
 left  HX TONSILLECTOMY  GIORGI BIOPSY BREAST STEREOTACTIC    
 right Family History: 
Family History Problem Relation Age of Onset  Malignant Hyperthermia Neg Hx  Pseudocholinesterase Deficiency Neg Hx  Delayed Awakening Neg Hx  Post-op Nausea/Vomiting Neg Hx  Emergence Delirium Neg Hx  Post-op Cognitive Dysfunction Neg Hx   
 Other Neg Hx Social History: 
Social History Tobacco Use  Smoking status: Never Smoker  Smokeless tobacco: Never Used Substance Use Topics  Alcohol use: No  
 Drug use: No  
 
 
Allergies: Allergies Allergen Reactions  Acetaminophen Itching Restless legs  Vicodin [Hydrocodone-Acetaminophen] Itching Review of Systems Review of Systems Constitutional: Positive for diaphoresis. Negative for chills and fever. Respiratory: Positive for shortness of breath. Cardiovascular: Positive for chest pain. All other systems reviewed and are negative. Physical Exam  
 
Vitals:  
 05/19/19 0430 05/19/19 0503 05/19/19 0530 05/19/19 8602 BP: 128/79 135/83 123/75 128/87 Pulse: 72 72 73 73 Resp: 19 20 20 20 Temp:    98.2 °F (36.8 °C) SpO2:   100% 97% Weight:      
Height:      
 
Physical Exam  
Constitutional: She is oriented to person, place, and time. She appears well-developed and well-nourished. No distress. HENT:  
Head: Normocephalic and atraumatic. Right Ear: External ear normal.  
Left Ear: External ear normal.  
Mouth/Throat: Oropharynx is clear and moist. No oropharyngeal exudate. Eyes: Pupils are equal, round, and reactive to light. Conjunctivae and EOM are normal. No scleral icterus. No pallor Neck: Normal range of motion. Neck supple. No JVD present. No tracheal deviation present. No thyromegaly present. Cardiovascular: Normal rate, regular rhythm and normal heart sounds. Pulmonary/Chest: Effort normal and breath sounds normal. No stridor. No respiratory distress. Abdominal: Soft. Bowel sounds are normal. She exhibits no distension. There is no tenderness. There is no rebound and no guarding. Musculoskeletal: Normal range of motion. She exhibits no edema or tenderness. No soft tissue injuries Lymphadenopathy:  
  She has no cervical adenopathy. Neurological: She is alert and oriented to person, place, and time. She has normal reflexes. No cranial nerve deficit. Coordination normal.  
Skin: Skin is warm and dry. No rash noted. She is not diaphoretic. No erythema. Psychiatric: She has a normal mood and affect. Her behavior is normal. Judgment and thought content normal.  
Nursing note and vitals reviewed. Diagnostic Study Results Labs - 
  
 Recent Results (from the past 12 hour(s)) CBC WITH AUTOMATED DIFF Collection Time: 05/19/19  2:08 AM  
Result Value Ref Range WBC 15.0 (H) 4.6 - 13.2 K/uL  
 RBC 4.93 4.20 - 5.30 M/uL  
 HGB 11.0 (L) 12.0 - 16.0 g/dL HCT 35.4 35.0 - 45.0 % MCV 71.8 (L) 74.0 - 97.0 FL  
 MCH 22.3 (L) 24.0 - 34.0 PG  
 MCHC 31.1 31.0 - 37.0 g/dL  
 RDW 16.9 (H) 11.6 - 14.5 % PLATELET 446 (H) 435 - 420 K/uL MPV 8.4 (L) 9.2 - 11.8 FL  
 NEUTROPHILS 61 40 - 73 % LYMPHOCYTES 28 21 - 52 % MONOCYTES 7 3 - 10 % EOSINOPHILS 4 0 - 5 % BASOPHILS 0 0 - 2 %  
 ABS. NEUTROPHILS 9.2 (H) 1.8 - 8.0 K/UL  
 ABS. LYMPHOCYTES 4.2 (H) 0.9 - 3.6 K/UL  
 ABS. MONOCYTES 1.1 0.05 - 1.2 K/UL  
 ABS. EOSINOPHILS 0.5 (H) 0.0 - 0.4 K/UL  
 ABS. BASOPHILS 0.0 0.0 - 0.1 K/UL  
 DF AUTOMATED CARDIAC PANEL,(CK, CKMB & TROPONIN) Collection Time: 05/19/19  2:08 AM  
Result Value Ref Range CK 70 26 - 192 U/L  
 CK - MB <1.0 <3.6 ng/ml CK-MB Index  0.0 - 4.0 % CALCULATION NOT PERFORMED WHEN RESULT IS BELOW LINEAR LIMIT Troponin-I, QT 0.02 0.0 - 8.921 NG/ML  
METABOLIC PANEL, COMPREHENSIVE Collection Time: 05/19/19  2:08 AM  
Result Value Ref Range Sodium 143 136 - 145 mmol/L Potassium 3.4 (L) 3.5 - 5.5 mmol/L Chloride 109 (H) 100 - 108 mmol/L  
 CO2 23 21 - 32 mmol/L Anion gap 11 3.0 - 18 mmol/L Glucose 159 (H) 74 - 99 mg/dL BUN 12 7.0 - 18 MG/DL Creatinine 1.02 0.6 - 1.3 MG/DL  
 BUN/Creatinine ratio 12 12 - 20 GFR est AA >60 >60 ml/min/1.73m2 GFR est non-AA 55 (L) >60 ml/min/1.73m2 Calcium 9.7 8.5 - 10.1 MG/DL Bilirubin, total 0.2 0.2 - 1.0 MG/DL  
 ALT (SGPT) 15 13 - 56 U/L  
 AST (SGOT) 12 (L) 15 - 37 U/L Alk. phosphatase 128 (H) 45 - 117 U/L Protein, total 7.0 6.4 - 8.2 g/dL Albumin 3.5 3.4 - 5.0 g/dL Globulin 3.5 2.0 - 4.0 g/dL A-G Ratio 1.0 0.8 - 1.7 URINALYSIS W/ RFLX MICROSCOPIC Collection Time: 05/19/19  3:00 AM  
Result Value Ref Range Color YELLOW Appearance CLEAR Specific gravity 1.009 1.005 - 1.030    
 pH (UA) 6.5 5.0 - 8.0 Protein NEGATIVE  NEG mg/dL Glucose NEGATIVE  NEG mg/dL Ketone NEGATIVE  NEG mg/dL Bilirubin NEGATIVE  NEG Blood NEGATIVE  NEG Urobilinogen 0.2 0.2 - 1.0 EU/dL Nitrites NEGATIVE  NEG Leukocyte Esterase TRACE (A) NEG URINE MICROSCOPIC ONLY Collection Time: 05/19/19  3:00 AM  
Result Value Ref Range WBC 0 to 2 0 - 5 /hpf Epithelial cells FEW 0 - 5 /lpf  
TROPONIN I Collection Time: 05/19/19  5:01 AM  
Result Value Ref Range Troponin-I, QT 0.11 (H) 0.0 - 0.045 NG/ML Radiologic Studies -  
XR CHEST PORT    (Results Pending) 5:46 AM 
RADIOLOGY FINDINGS Chest X-ray shows no acute process Pending review by Radiologist 
Recorded by Estrella Hoffman ED Scribe, as dictated by Drake Alcantara. Damien Rosas MD 
 
CT Results  (Last 48 hours) None CXR Results  (Last 48 hours) None Medications given in the ED- Medications  
famotidine (PF) (PEPCID) injection 20 mg (20 mg IntraVENous Given 5/19/19 0437) mylanta/viscous lidocaine (GI COCKTAIL) (50 mL Oral Given 5/19/19 0437) Medical Decision Making I am the first provider for this patient. I reviewed the vital signs, available nursing notes, past medical history, past surgical history, family history and social history. Vital Signs-Reviewed the patient's vital signs. Pulse Oximetry Analysis - 100% on RA Cardiac Monitor: 
Rate: 75 bpm 
Rhythm: NSR 
 
EKG interpretation: (EMS) 
3:34 AM  
Sinus tachycardia, 100 bpm, Inferior T wave abnormality nonspecific EKG read by Drake Alcantara. Damien Rosas MD at 1:28 AM  
 
EKG interpretation: (Preliminary) 3:54 AM  
NSR, 78 bpm, QTc 433 ms EKG read by Drake Alcantara. Damien Rosas MD at 2:05 AM 
 
 
Records Reviewed: Nursing Notes and Old Medical Records Provider Notes (Medical Decision Making): Ddx: possible but unlikely PE. Would strongly consider arrhythmia or ACS followed by reflux or pancreatitis. Followed by [facility] PTX or PNA. Procedures: 
Procedures ED Course:  
3:35 AM Initial assessment performed. The patients presenting problems have been discussed, and they are in agreement with the care plan formulated and outlined with them. I have encouraged them to ask questions as they arise throughout their visit. 5:40 AM Discussed patient's history, exam, and available diagnostics results with Dr. Jaida Reynolds, internal medicine, who agrees with admission to telemetry. Diagnosis and Disposition Critical Care Time: none Core Measures: 
For Hospitalized Patients: 
 
1. Hospitalization Decision Time: The decision to hospitalize the patient was made by Nikki Giron. Geraldine Freeman MD at 5:44 AM on 5/19/2019 2. Aspirin: Aspirin was given by EMS en route. 5:42 AM  Patient is being admitted to the hospital by Dr. Maura Dupont. The results of their tests and reasons for their admission have been discussed with them and/or available family. They convey agreement and understanding for the need to be admitted and for their admission diagnosis. CONDITIONS ON ADMISSION: 
Sepsis is not present at the time of admission. Deep Vein Thrombosis is not present at the time of admission. Thrombosis is not present at the time of admission. Urinary Tract Infection is not present at the time of admission. Pneumonia is not present at the time of admission. MRSA is not present at the time of admission. Wound infection is not present at the time of admission. Pressure Ulcer is not present at the time of admission. CLINICAL IMPRESSION: 
 
1. Acute chest pain 2. Type 2 diabetes mellitus with complication, without long-term current use of insulin ([address] 3. Elevated cholesterol PLAN: 
1. ADMIT TO TELEMETRY 
_______________________________ Attestations:  
This note is prepared by [facility], acting as Scribe for Nikki Giron. Yane Silverman MD 
. Manish Rosario. Yane Silverman MD:  The scribe's documentation has been prepared under my direction and personally reviewed by me in its entirety. I confirm that the note above accurately reflects all work, treatment, procedures, and medical decision making performed by me. 
_______________________________

## 2019-05-19 NOTE — ED NOTES
Pt hourly rounding competed. Safety Pt (X) resting on stretcher with side rails up and call bell in reach. () in chair 
  () in parents arms. Toileting Pt offered ()Bedpan 
   (X)Assistance to Restroom 
   ()Urinal 
Ongoing UpdatesUpdated on plan of care and status of test results. Pain Management Inquired as to comfort and offered comfort measures: 
  () warm blankets (X) dimmed lights

## 2019-05-19 NOTE — ED NOTES
Provider was at bedside, pt pain increased, provider ordered nitor SL, pt states that pain is now back down to a one.

## 2019-05-19 NOTE — ROUTINE PROCESS
TRANSFER - OUT REPORT: 
 
Verbal report given to [facility] RN unit(name) on Sage Santana  being transferred to 3N(unit) for routine progression of care Report consisted of patients Situation, Background, Assessment and  
Recommendations(SBAR). Information from the following report(s) SBAR, ED Summary, MAR and Cardiac Rhythm Normal Sinus Rythm verified with Yusef Calles RN was reviewed with the receiving nurse. Lines:    
 
Opportunity for questions and clarification was provided. Pt AAOX4 Ambulatory Skin intact IV 20g L AC patent Cardiac rhythm normal sinus w/dual signoff Pt initial troponin normal follow up troponin 0.11 Vital signs update for MEWS Patient transported with: 
 Monitor Registered Nurse

## 2019-05-19 NOTE — ED TRIAGE NOTES
Pt arrived via EMS c/o sudden onset dull cx pain that radiates to back. Pt states hx of SVT and angina. EMS gave 324 aspirin.

## 2019-05-19 NOTE — PROGRESS NOTES
DC Plan: Discharge home with MD follow up, family assistance Chart reviewed. Pt admitted for chest pain. Met with pt at bedside. Pt states she is independent. States she drives to own appts. States her family member will drive home at discharge if she is out of work, otherwise she may need a Lyft. Home address confirmed per face sheet. Pt states her PCP is [place]. Pts cardiologist is MD Hiram Rubi, but would like to see cardiologist she is seeing in hospital. Pt denies using [facility] or [facility] services. No dc concerns identified. Reason for Admission:   Chest pain RRAT Score:          8 low Plan for utilizing home health:      None pt independent Current Advanced Directive/Advance Care Plan: Not on file Likelihood of Readmission:  low Transition of Care Plan:           MD follow up Care Management Interventions PCP Verified by CM: Yes Mode of Transport at Discharge: Other (see comment)(family or Lyft) Transition of Care Consult (CM Consult): Discharge Planning Current Support Network: Own Home(lives with niece) Confirm Follow Up Transport: Family Plan discussed with Pt/Family/Caregiver: Yes Discharge Location Discharge Placement: Home with family assistance

## 2019-05-19 NOTE — Clinical Note
ID band present and verified. Trace amount of protein in the urine from diabetes-will monitor and possibly restart lisinopril in the future to help with the urine protein if his bp's tolertate.  Please inform the pt.          Documentation      Pt. Informed of this as requested by Dr. Lyon.

## 2019-05-19 NOTE — Clinical Note
TRANSFER - OUT REPORT:  
 
Verbal report given to: Mitch Ware. Report consisted of patient's Situation, Background, Assessment and  
Recommendations(SBAR). Opportunity for questions and clarification was provided. Patient transported with a Registered Nurse.

## 2019-05-19 NOTE — Clinical Note
Balloon inserted. Balloon inflated using multiple inflations inflation technique. Pressure = 8 danyel; Duration = 12 sec.

## 2019-05-19 NOTE — Clinical Note
Sheath #1: Sheath: inserted. Sheath inserted/placed in the right radial  artery. Hemostasis achieved.

## 2019-05-19 NOTE — Clinical Note
Lesion: Located in the Proximal LAD. Stent inserted. Single technique used. First inflation pressure = 12 danyel; inflation time: 5 sec.

## 2019-05-19 NOTE — ROUTINE PROCESS
Shift summary: 
1037: Paged Dr. Yanes to notify him about pt troponin being 0. 13. Received verbal orders from  to allow pt to eat. 1635: Pt complains of chest pain while eating. 1638: Checked pt vitals and also rhythm on the monitors. Pt vitals were within normal limits and HR 80 rating at NSR.   
1639: Administered PRN nitro 0.4 sublingual. 
1641: Pt states the pain has relieved some rated the pain a 6 out of 10. 
1645: Pt complains of chest pain remaining there. Administered another 0.4 sublingual of nitro. 1650: Reassesed pt. Pt asleep in bed, will  continue to monitor pt.  
1745: Pt complaining of chest pain paged Dr. Carla Lucas waiting for response. 1755: Notified Dr. Carla Lucas of pt chest pain, Dr. Carla Lucas stated to have a EKG done on pt and also to administer nitro. Will continue to monitor pt.  
1709: Notified Dr. Milton Posey about pt complaints and EKG results. Dr. Yanes stated he will take a look at the EKG and give me a call back.

## 2019-05-19 NOTE — PROGRESS NOTES
Patient having more chest pain initially relieved by sl nitro. Has recurred. EKG now shows minor inferior ST-T changes. Will keep npo and plan cath tomorrow AM. If she becomes unstable will proceed with cath sooner.    
 
Rad Elizalde MD

## 2019-05-19 NOTE — CONSULTS
Cardiolology  Inpatient Consult      Patient: Afshan Chappell               Sex: female          DOA: 5/19/2019       YOB: 1955      Age:  59 y.o.        LOS:  LOS: 0 days      Afshan Chappell is a 59 y.o. female admitted for Acute chest pain [R07.9]  Diabetes ([facility] [E11.9]     Recommendations:  · Serial markers  · Echo  · Nuclear stress test ordered  · Follow    Impression:  · Chest pain  · Elevated troponin  · Diabetes  · Hypertension  · HLD  · Other problems as enumerated below    Patient Active Problem List    Diagnosis Date Noted    Diabetes ([facility] 05/19/2019    Acute chest pain 05/19/2019    Rotator cuff impingement syndrome of left shoulder 01/07/2019    DM (diabetes mellitus) ([facility] 09/13/2013    HTN (hypertension) 09/13/2013    Elevated cholesterol 09/13/2013    History of angina 09/13/2013    Cervical spondylosis 09/13/2013    Cervical spinal stenosis 09/13/2013      Luther Bledsoe MD  Past Medical History:   Diagnosis Date    Arrhythmia 1990's    SVT- from low potassium    Chronic pain     neck, back, left shoulder    Diabetes [facility]) Summer 2018    GERD (gastroesophageal reflux disease)     Hypercholesterolemia     taken off medication 1990's    Hypertension late 1970's    Insomnia     Menopause     Neuropathy     OA (osteoarthritis)     Psychiatric disorder     depression, anxiety    Sleep apnea     uses CPAP      Past Surgical History:   Procedure Laterality Date    HX BREAST BIOPSY      right    HX CERVICAL FUSION  2012    anterior    HX GI  2017    colonoscopy    HX HYSTERECTOMY  1991    HX KNEE ARTHROSCOPY  2011    right    HX OTHER SURGICAL      hemorrhoidectomy    HX SHOULDER ARTHROSCOPY      left    HX TONSILLECTOMY      GIORGI BIOPSY BREAST STEREOTACTIC      right     Allergies   Allergen Reactions    Acetaminophen Itching     Restless legs    Vicodin [Hydrocodone-Acetaminophen] Itching      Family History   Problem Relation Age of Onset    Malignant Hyperthermia Neg Hx     Pseudocholinesterase Deficiency Neg Hx     Delayed Awakening Neg Hx     Post-op Nausea/Vomiting Neg Hx     Emergence Delirium Neg Hx     Post-op Cognitive Dysfunction Neg Hx     Other Neg Hx       Current Facility-Administered Medications   Medication Dose Route Frequency    heparin (porcine) injection 5,000 Units  5,000 Units SubCUTAneous Q8H    cloNIDine HCl (CATAPRES) tablet 0.2 mg  0.2 mg Oral BID    dilTIAZem CD (CARDIZEM CD) 420 mg  420 mg Oral DAILY    ferrous sulfate tablet 325 mg  1 Tab Oral ACB    gabapentin (NEURONTIN) capsule 300 mg  300 mg Oral BID    ondansetron (ZOFRAN ODT) tablet 4 mg  4 mg Oral Q8H PRN    potassium chloride (K-DUR, KLOR-CON) SR tablet 20 mEq  20 mEq Oral EVERY OTHER DAY    valACYclovir (VALTREX) tablet 500 mg  500 mg Oral DAILY    insulin lispro (HUMALOG) injection   SubCUTAneous Q4H    glucose chewable tablet 16 g  16 g Oral PRN    glucagon (GLUCAGEN) injection 1 mg  1 mg IntraMUSCular PRN    dextrose (D50W) injection syrg 12.5-25 g  25-50 mL IntraVENous PRN    atorvastatin (LIPITOR) tablet 20 mg  20 mg Oral QHS    nitroglycerin (NITROSTAT) tablet 0.4 mg  0.4 mg SubLINGual PRN    fish oil-omega-3 fatty acids 340-1,000 mg capsule 1 Cap  1 Cap Oral DAILY         Review of Symptoms:  Review of Systems  Gen: No fever, chills, malaise, weight loss/gain. Heart: +chest pain, no palpitations, CARDOSO, pnd, or orthopnea. Resp: No cough, hemoptysis, wheezing; +shortness of breath with the chest pain. GI: No nausea, vomiting, diarrhea, constipation, melena or hematochezia. : No urinary obstruction, dysuria or hematuria. Derm: No rash, new skin lesion or pruritis. Vasc: No edema, cyanosis or claudication. Endo: No heat/cold intolerance, no polyuria,polydipsia or polyphagia. Neuro: No unilateral weakness, numbness, tingling. No seizures. Heme: No easy bruising or bleeding.         Subjective:  Devorah Wilson is a 59 y.o. female who has a history of diabetes and HTN presenting with chest pain radiating to her back early this morning. She noticed it on the way back from the bathroom and it has not abated since it began. Her niece called EMS and ASA was given en route. GI cocktail improved her discomfort somewhat. Was seen in the ER last week for dizziness but returned to baseline and was feeling well when she went to bed last night. At the time I saw her she was pain free. .  Cardiac risk factors: extant      Physical Exam    Visit Vitals  /87 (BP 1 Location: Right arm, BP Patient Position: At rest)   Pulse 72   Temp 97.9 °F (36.6 °C)   Resp 18   Ht 5' 4\" (1.626 m)   Wt 78.5 kg (173 lb)   SpO2 100%   BMI 29.70 kg/m²       General Appearance:  Well developed, well nourished,alert and oriented x 3, and individual in no acute distress. Ears/Nose/Mouth/Throat:   Hearing grossly normal.         Neck: Supple. Chest:   Lungs clear to auscultation bilaterally. Cardiovascular:  Regular rate and rhythm, S1, S2 normal, no murmur. Abdomen:   Soft, non-tender, bowel sounds are active. Extremities: No edema bilaterally. Skin: Warm and dry. Cardiographics    Telemetry: normal sinus rhythm  ECst degree AV block, otherwise normal  Echocardiogram: pending    Recent radiology, intake/output and wt reviewed    Labs:   Recent Results (from the past 48 hour(s))   CBC WITH AUTOMATED DIFF    Collection Time: 19  2:08 AM   Result Value Ref Range    WBC 15.0 (H) 4.6 - 13.2 K/uL    RBC 4.93 4.20 - 5.30 M/uL    HGB 11.0 (L) 12.0 - 16.0 g/dL    HCT 35.4 35.0 - 45.0 %    MCV 71.8 (L) 74.0 - 97.0 FL    MCH 22.3 (L) 24.0 - 34.0 PG    MCHC 31.1 31.0 - 37.0 g/dL    RDW 16.9 (H) 11.6 - 14.5 %    PLATELET 692 (H) 679 - 420 K/uL    MPV 8.4 (L) 9.2 - 11.8 FL    NEUTROPHILS 61 40 - 73 %    LYMPHOCYTES 28 21 - 52 %    MONOCYTES 7 3 - 10 %    EOSINOPHILS 4 0 - 5 %    BASOPHILS 0 0 - 2 %    ABS. NEUTROPHILS 9.2 (H) 1.8 - 8.0 K/UL    ABS.  LYMPHOCYTES 4.2 (H) 0.9 - 3.6 K/UL    ABS. MONOCYTES 1.1 0.05 - 1.2 K/UL    ABS. EOSINOPHILS 0.5 (H) 0.0 - 0.4 K/UL    ABS. BASOPHILS 0.0 0.0 - 0.1 K/UL    DF AUTOMATED     CARDIAC PANEL,(CK, CKMB & TROPONIN)    Collection Time: 05/19/19  2:08 AM   Result Value Ref Range    CK 70 26 - 192 U/L    CK - MB <1.0 <3.6 ng/ml    CK-MB Index  0.0 - 4.0 %     CALCULATION NOT PERFORMED WHEN RESULT IS BELOW LINEAR LIMIT    Troponin-I, QT 0.02 0.0 - 8.845 NG/ML   METABOLIC PANEL, COMPREHENSIVE    Collection Time: 05/19/19  2:08 AM   Result Value Ref Range    Sodium 143 136 - 145 mmol/L    Potassium 3.4 (L) 3.5 - 5.5 mmol/L    Chloride 109 (H) 100 - 108 mmol/L    CO2 23 21 - 32 mmol/L    Anion gap 11 3.0 - 18 mmol/L    Glucose 159 (H) 74 - 99 mg/dL    BUN 12 7.0 - 18 MG/DL    Creatinine 1.02 0.6 - 1.3 MG/DL    BUN/Creatinine ratio 12 12 - 20      GFR est AA >60 >60 ml/min/1.73m2    GFR est non-AA 55 (L) >60 ml/min/1.73m2    Calcium 9.7 8.5 - 10.1 MG/DL    Bilirubin, total 0.2 0.2 - 1.0 MG/DL    ALT (SGPT) 15 13 - 56 U/L    AST (SGOT) 12 (L) 15 - 37 U/L    Alk.  phosphatase 128 (H) 45 - 117 U/L    Protein, total 7.0 6.4 - 8.2 g/dL    Albumin 3.5 3.4 - 5.0 g/dL    Globulin 3.5 2.0 - 4.0 g/dL    A-G Ratio 1.0 0.8 - 1.7     URINALYSIS W/ RFLX MICROSCOPIC    Collection Time: 05/19/19  3:00 AM   Result Value Ref Range    Color YELLOW      Appearance CLEAR      Specific gravity 1.009 1.005 - 1.030      pH (UA) 6.5 5.0 - 8.0      Protein NEGATIVE  NEG mg/dL    Glucose NEGATIVE  NEG mg/dL    Ketone NEGATIVE  NEG mg/dL    Bilirubin NEGATIVE  NEG      Blood NEGATIVE  NEG      Urobilinogen 0.2 0.2 - 1.0 EU/dL    Nitrites NEGATIVE  NEG      Leukocyte Esterase TRACE (A) NEG     URINE MICROSCOPIC ONLY    Collection Time: 05/19/19  3:00 AM   Result Value Ref Range    WBC 0 to 2 0 - 5 /hpf    Epithelial cells FEW 0 - 5 /lpf   TROPONIN I    Collection Time: 05/19/19  5:01 AM   Result Value Ref Range    Troponin-I, QT 0.11 (H) 0.0 - 0.045 NG/ML   GLUCOSE, POC Collection Time: 05/19/19  6:53 AM   Result Value Ref Range    Glucose (POC) 101 70 - 110 mg/dL   CARDIAC PANEL,(CK, CKMB & TROPONIN)    Collection Time: 05/19/19  7:41 AM   Result Value Ref Range    CK 62 26 - 192 U/L    CK - MB <1.0 <3.6 ng/ml    CK-MB Index  0.0 - 4.0 %     CALCULATION NOT PERFORMED WHEN RESULT IS BELOW LINEAR LIMIT    Troponin-I, QT 0.13 (H) 0.0 - 0.045 NG/ML   EKG, 12 LEAD, SUBSEQUENT    Collection Time: 05/19/19  8:42 AM   Result Value Ref Range    Ventricular Rate 70 BPM    Atrial Rate 70 BPM    P-R Interval 220 ms    QRS Duration 80 ms    Q-T Interval 406 ms    QTC Calculation (Bezet) 438 ms    Calculated P Axis 55 degrees    Calculated R Axis 12 degrees    Calculated T Axis 14 degrees    Diagnosis       Sinus rhythm with 1st degree AV block  Otherwise normal ECG  When compared with ECG of 28-DEC-2018 22:31,  premature atrial complexes are no longer present  AZ interval has increased  Vent.  rate has decreased BY  35 BPM  Confirmed by Mark Anthony Shelton MD, -- ([id number]) on 5/19/2019 11:49:11 AM             Young Locke MD

## 2019-05-19 NOTE — Clinical Note
Lesion located in the Proximal LAD. Balloon inserted. Balloon inflated using multiple inflations inflation technique. Pressure = 17 danyel; Duration = 13 sec. Inflation 2: Pressure: 17 danyel; Duration: 10 sec.

## 2019-05-19 NOTE — Clinical Note
Lesion located in the Proximal LAD. Balloon inflated using multiple inflations inflation technique. Pressure = 12 danyel; Duration = 5 sec.  Stent balloon

## 2019-05-19 NOTE — H&P
History & Physical 
Patient: Alejandro Daniel MRN: [id number]  CSN: [id number] YOB: 1955  Age: 59 y.o. Sex: female DOA: 5/19/2019 Primary Care Provider:  Good Ng MD 
 
 
Assessment/Plan Patient Active Problem List  
Diagnosis Code  DM (diabetes mellitus) ([facility] E11.9  
 HTN (hypertension) I10  
 Elevated cholesterol E78.00  
 History of angina Z86.79  
 Cervical spondylosis M47.812  Cervical spinal stenosis M48.02  
 Rotator cuff impingement syndrome of left shoulder M75.42  
 Diabetes (HCC) E11.9  Acute chest pain R07.9  
 
 
60 y/o female w/ diabetes and an episode of chest pain early this morning with elevated troponin and possible NSTEMI. Her HEART score is moderate. Discussed with cardiology who will consult today and did not recommend anticoagulation at this time. She will likely need risk stratification. Will continue to trend troponins. Will place her on oxygen and use nitro as needed. 5 points Moderate Score (4-6 points) Risk of MACE of 12-16.6%. If troponin is positive, many experts recommend further workup and admission even with a low HEART Score. Estimated length of stay : 2 nights Tita Covington MD 
Nocturnist 
 
------------------------------------------------------------------------------------------------------------------------- 
 
CC: chest pain HPI:  
 
Alejandro Daniel is a 59 y.o. female who has a history of diabetes and HTN presenting with chest pain radiating to her back early this morning. She noticed it on the way back from the bathroom and it has not abated since it began. Her niece called EMS and ASA was given en route. GI cocktail improved her discomfort somewhat. Was seen in the ER last week for dizziness but returned to baseline and was feeling well when she went to bed last night. Past Medical History:  
Diagnosis Date  Arrhythmia 1990's SVT- from low potassium  Chronic pain neck, back, left shoulder  Diabetes [facility]) Summer 2018  GERD (gastroesophageal reflux disease)  Hypercholesterolemia   
 taken off medication 1990's  Hypertension late 1970's  Insomnia  Menopause  Neuropathy  OA (osteoarthritis)  Psychiatric disorder   
 depression, anxiety  Sleep apnea   
 uses CPAP Past Surgical History:  
Procedure Laterality Date  HX BREAST BIOPSY    
 right  HX CERVICAL FUSION  2012  
 anterior  HX GI  2017  
 colonoscopy  HX HYSTERECTOMY  1991  
 HX KNEE ARTHROSCOPY  2011  
 right  HX OTHER SURGICAL    
 hemorrhoidectomy  HX SHOULDER ARTHROSCOPY    
 left  HX TONSILLECTOMY  GIORGI BIOPSY BREAST STEREOTACTIC    
 right Family History Problem Relation Age of Onset  Malignant Hyperthermia Neg Hx  Pseudocholinesterase Deficiency Neg Hx  Delayed Awakening Neg Hx  Post-op Nausea/Vomiting Neg Hx  Emergence Delirium Neg Hx  Post-op Cognitive Dysfunction Neg Hx   
 Other Neg Hx Social History Socioeconomic History  Marital status: SINGLE Spouse name: Not on file  Number of children: Not on file  Years of education: Not on file  Highest education level: Not on file Tobacco Use  Smoking status: Never Smoker  Smokeless tobacco: Never Used Substance and Sexual Activity  Alcohol use: No  
 Drug use: No  
 
 
Prior to Admission medications Medication Sig Start Date End Date Taking? Authorizing Provider  
ondansetron (ZOFRAN ODT) 4 mg disintegrating tablet Take 1 Tab by mouth every eight (8) hours as needed for Nausea. 12/29/18   Dheeraj Davis PA  
cloNIDine HCl (CATAPRES) 0.2 mg tablet Take 0.2 mg by mouth two (2) times a day. Provider, Historical  
Omega-3 Fatty Acids (FISH OIL) 500 mg cap Take  by mouth daily. Provider, Historical  
ferrous sulfate (IRON) 325 mg (65 mg iron) tablet Take  by mouth Daily (before breakfast).     Provider, Historical  
 valACYclovir (VALTREX) 1 gram tablet Take  by mouth daily. Provider, Historical  
diltiazem LA (CARDIZEM LA) 420 mg ER tablet Take 420 mg by mouth daily. Provider, Historical  
gabapentin (NEURONTIN) 100 mg capsule Take 300 mg by mouth two (2) times a day. Provider, Historical  
potassium chloride (K-DUR, KLOR-CON) 20 mEq tablet Take 20 mEq by mouth every other day. Provider, Historical  
 
 
Allergies Allergen Reactions  Acetaminophen Itching Restless legs  Vicodin [Hydrocodone-Acetaminophen] Itching Review of Systems Gen: No fever, chills, malaise, weight loss/gain. Heart: +chest pain, no palpitations, CARDOSO, pnd, or orthopnea. Resp: No cough, hemoptysis, wheezing; +shortness of breath with the chest pain. GI: No nausea, vomiting, diarrhea, constipation, melena or hematochezia. : No urinary obstruction, dysuria or hematuria. Derm: No rash, new skin lesion or pruritis. Vasc: No edema, cyanosis or claudication. Endo: No heat/cold intolerance, no polyuria,polydipsia or polyphagia. Neuro: No unilateral weakness, numbness, tingling. No seizures. Heme: No easy bruising or bleeding. Physical Exam:  
 
Physical Exam: 
Visit Vitals /84 (BP 1 Location: Right arm, BP Patient Position: Sitting) Pulse 78 Temp 98.2 °F (36.8 °C) Resp 18 Ht 5' 4\" (1.626 m) Wt 78.5 kg (173 lb) SpO2 97% BMI 29.70 kg/m² O2 Device: Room air Temp (24hrs), Av.2 °F (36.8 °C), Min:98.2 °F (36.8 °C), Max:98.2 °F (36.8 °C) No intake/output data recorded. No intake/output data recorded. General:  Awake, cooperative, no distress. Head:  Normocephalic, without obvious abnormality, atraumatic. Eyes:  Conjunctivae/corneas clear, sclera anicteric, EOMs intact. Neck: Supple, symmetrical, trachea midline, no adenopathy. Lungs:   Clear to auscultation bilaterally. Heart:  Regular rate and rhythm, S1, S2 normal, no murmur, click, rub or gallop. Abdomen: Soft, non-tender. Bowel sounds normal. No masses,  No organomegaly. Extremities: Extremities normal, atraumatic, no cyanosis or edema. Capillary refill normal.  
Pulses: 2+ and symmetric all extremities. Skin: Skin color pink, turgor normal. No rashes or lesions Neurologic: No focal motor or sensory deficit. No chest wall pain. Labs Reviewed: All lab results for the last 24 hours reviewed. Recent Results (from the past 24 hour(s)) CBC WITH AUTOMATED DIFF Collection Time: 05/19/19  2:08 AM  
Result Value Ref Range WBC 15.0 (H) 4.6 - 13.2 K/uL  
 RBC 4.93 4.20 - 5.30 M/uL  
 HGB 11.0 (L) 12.0 - 16.0 g/dL HCT 35.4 35.0 - 45.0 % MCV 71.8 (L) 74.0 - 97.0 FL  
 MCH 22.3 (L) 24.0 - 34.0 PG  
 MCHC 31.1 31.0 - 37.0 g/dL  
 RDW 16.9 (H) 11.6 - 14.5 % PLATELET 736 (H) 718 - 420 K/uL MPV 8.4 (L) 9.2 - 11.8 FL  
 NEUTROPHILS 61 40 - 73 % LYMPHOCYTES 28 21 - 52 % MONOCYTES 7 3 - 10 % EOSINOPHILS 4 0 - 5 % BASOPHILS 0 0 - 2 %  
 ABS. NEUTROPHILS 9.2 (H) 1.8 - 8.0 K/UL  
 ABS. LYMPHOCYTES 4.2 (H) 0.9 - 3.6 K/UL  
 ABS. MONOCYTES 1.1 0.05 - 1.2 K/UL  
 ABS. EOSINOPHILS 0.5 (H) 0.0 - 0.4 K/UL  
 ABS. BASOPHILS 0.0 0.0 - 0.1 K/UL  
 DF AUTOMATED CARDIAC PANEL,(CK, CKMB & TROPONIN) Collection Time: 05/19/19  2:08 AM  
Result Value Ref Range CK 70 26 - 192 U/L  
 CK - MB <1.0 <3.6 ng/ml CK-MB Index  0.0 - 4.0 % CALCULATION NOT PERFORMED WHEN RESULT IS BELOW LINEAR LIMIT Troponin-I, QT 0.02 0.0 - 3.960 NG/ML  
METABOLIC PANEL, COMPREHENSIVE Collection Time: 05/19/19  2:08 AM  
Result Value Ref Range Sodium 143 136 - 145 mmol/L Potassium 3.4 (L) 3.5 - 5.5 mmol/L Chloride 109 (H) 100 - 108 mmol/L  
 CO2 23 21 - 32 mmol/L Anion gap 11 3.0 - 18 mmol/L Glucose 159 (H) 74 - 99 mg/dL BUN 12 7.0 - 18 MG/DL Creatinine 1.02 0.6 - 1.3 MG/DL  
 BUN/Creatinine ratio 12 12 - 20 GFR est AA >60 >60 ml/min/1.73m2 GFR est non-AA 55 (L) >60 ml/min/1.73m2 Calcium 9.7 8.5 - 10.1 MG/DL Bilirubin, total 0.2 0.2 - 1.0 MG/DL  
 ALT (SGPT) 15 13 - 56 U/L  
 AST (SGOT) 12 (L) 15 - 37 U/L Alk. phosphatase 128 (H) 45 - 117 U/L Protein, total 7.0 6.4 - 8.2 g/dL Albumin 3.5 3.4 - 5.0 g/dL Globulin 3.5 2.0 - 4.0 g/dL A-G Ratio 1.0 0.8 - 1.7 URINALYSIS W/ RFLX MICROSCOPIC Collection Time: 05/19/19  3:00 AM  
Result Value Ref Range Color YELLOW Appearance CLEAR Specific gravity 1.009 1.005 - 1.030    
 pH (UA) 6.5 5.0 - 8.0 Protein NEGATIVE  NEG mg/dL Glucose NEGATIVE  NEG mg/dL Ketone NEGATIVE  NEG mg/dL Bilirubin NEGATIVE  NEG Blood NEGATIVE  NEG Urobilinogen 0.2 0.2 - 1.0 EU/dL Nitrites NEGATIVE  NEG Leukocyte Esterase TRACE (A) NEG URINE MICROSCOPIC ONLY Collection Time: 05/19/19  3:00 AM  
Result Value Ref Range WBC 0 to 2 0 - 5 /hpf Epithelial cells FEW 0 - 5 /lpf  
TROPONIN I Collection Time: 05/19/19  5:01 AM  
Result Value Ref Range Troponin-I, QT 0.11 (H) 0.0 - 0.045 NG/ML Exam Information Status Exam Begun  Exam Ended Final [99] 5/19/2019 02:22 5/19/2019 02:27 Result Information Status: Final result (Exam End: 5/19/2019 02:27) Provider Status: Open Study Result  
 
============================ 
[facility] 
============================ 
  
EXAM:AP chest xray 
  INDICATION: Chest pain 
  
COMPARISON: 9/19/2013 chest x-ray 
  
FINDINGS:  
  
The cardiomediastinal silhouette is stable. Aorta is ectatic. The heart is not 
enlarged. The lungs are clear. The pulmonary vasculature is not engorged. The 
costophrenic angles are well defined. No pneumothorax is detected. 
  
The cortical margins are intact fusion hardware at the cervical spine is 
partially visualized. The trachea is midline. 
  
IMPRESSION IMPRESSION:  
  
No acute cardiopulmonary process.

## 2019-05-19 NOTE — Clinical Note
TRANSFER - IN REPORT:  
 
Verbal report received from: Katina. Report consisted of patient's Situation, Background, Assessment and  
Recommendations(SBAR). Opportunity for questions and clarification was provided. Assessment completed upon patient's arrival to unit and care assumed. Patient transported with a Registered Nurse.

## 2019-05-20 ENCOUNTER — APPOINTMENT (OUTPATIENT)
Dept: NON INVASIVE DIAGNOSTICS | Age: 64
DRG: 247 | End: 2019-05-20
Attending: INTERNAL MEDICINE
Payer: MEDICARE

## 2019-05-20 PROBLEM — I21.4 NSTEMI (NON-ST ELEVATED MYOCARDIAL INFARCTION) (HCC): Status: ACTIVE | Noted: 2019-05-19

## 2019-05-20 LAB
ALBUMIN SERPL-MCNC: 3.3 G/DL (ref 3.4–5)
ALBUMIN/GLOB SERPL: 1 {RATIO} (ref 0.8–1.7)
ALP SERPL-CCNC: 118 U/L (ref 45–117)
ALT SERPL-CCNC: 17 U/L (ref 13–56)
ANION GAP SERPL CALC-SCNC: 7 MMOL/L (ref 3–18)
AST SERPL-CCNC: 10 U/L (ref 15–37)
ATRIAL RATE: 70 BPM
ATRIAL RATE: 78 BPM
BASOPHILS # BLD: 0 K/UL (ref 0–0.1)
BASOPHILS NFR BLD: 0 % (ref 0–2)
BILIRUB SERPL-MCNC: 0.2 MG/DL (ref 0.2–1)
BUN SERPL-MCNC: 10 MG/DL (ref 7–18)
BUN/CREAT SERPL: 14 (ref 12–20)
CALCIUM SERPL-MCNC: 9.4 MG/DL (ref 8.5–10.1)
CALCULATED P AXIS, ECG09: 33 DEGREES
CALCULATED P AXIS, ECG09: 54 DEGREES
CALCULATED R AXIS, ECG10: -21 DEGREES
CALCULATED T AXIS, ECG11: 17 DEGREES
CALCULATED T AXIS, ECG11: 67 DEGREES
CHLORIDE SERPL-SCNC: 109 MMOL/L (ref 100–108)
CO2 SERPL-SCNC: 27 MMOL/L (ref 21–32)
CREAT SERPL-MCNC: 0.74 MG/DL (ref 0.6–1.3)
DIAGNOSIS, 93000: NORMAL
DIAGNOSIS, 93000: NORMAL
DIFFERENTIAL METHOD BLD: ABNORMAL
EOSINOPHIL # BLD: 0.4 K/UL (ref 0–0.4)
EOSINOPHIL NFR BLD: 3 % (ref 0–5)
ERYTHROCYTE [DISTWIDTH] IN BLOOD BY AUTOMATED COUNT: 16.7 % (ref 11.6–14.5)
ERYTHROCYTE [DISTWIDTH] IN BLOOD BY AUTOMATED COUNT: 16.7 % (ref 11.6–14.5)
GLOBULIN SER CALC-MCNC: 3.3 G/DL (ref 2–4)
GLUCOSE BLD STRIP.AUTO-MCNC: 113 MG/DL (ref 70–110)
GLUCOSE BLD STRIP.AUTO-MCNC: 117 MG/DL (ref 70–110)
GLUCOSE SERPL-MCNC: 119 MG/DL (ref 74–99)
HCT VFR BLD AUTO: 32.3 % (ref 35–45)
HCT VFR BLD AUTO: 33 % (ref 35–45)
HCT VFR BLD AUTO: 34.9 % (ref 35–45)
HGB BLD-MCNC: 10 G/DL (ref 12–16)
HGB BLD-MCNC: 11 G/DL (ref 12–16)
HGB BLD-MCNC: 9.8 G/DL (ref 12–16)
INR PPP: 1 (ref 0.8–1.2)
LYMPHOCYTES # BLD: 4.1 K/UL (ref 0.9–3.6)
LYMPHOCYTES NFR BLD: 37 % (ref 21–52)
MCH RBC QN AUTO: 21.9 PG (ref 24–34)
MCH RBC QN AUTO: 22.5 PG (ref 24–34)
MCHC RBC AUTO-ENTMCNC: 30.3 G/DL (ref 31–37)
MCHC RBC AUTO-ENTMCNC: 31.5 G/DL (ref 31–37)
MCV RBC AUTO: 71.4 FL (ref 74–97)
MCV RBC AUTO: 72.2 FL (ref 74–97)
MONOCYTES # BLD: 0.8 K/UL (ref 0.05–1.2)
MONOCYTES NFR BLD: 7 % (ref 3–10)
NEUTS SEG # BLD: 5.8 K/UL (ref 1.8–8)
NEUTS SEG NFR BLD: 53 % (ref 40–73)
P-R INTERVAL, ECG05: 168 MS
P-R INTERVAL, ECG05: 178 MS
PLATELET # BLD AUTO: 429 K/UL (ref 135–420)
PLATELET # BLD AUTO: 442 K/UL (ref 135–420)
PMV BLD AUTO: 8.4 FL (ref 9.2–11.8)
PMV BLD AUTO: 8.4 FL (ref 9.2–11.8)
POTASSIUM SERPL-SCNC: 3.5 MMOL/L (ref 3.5–5.5)
PROT SERPL-MCNC: 6.6 G/DL (ref 6.4–8.2)
PROTHROMBIN TIME: 13.1 SEC (ref 11.5–15.2)
Q-T INTERVAL, ECG07: 380 MS
Q-T INTERVAL, ECG07: 414 MS
QRS DURATION, ECG06: 82 MS
QRS DURATION, ECG06: 88 MS
QTC CALCULATION (BEZET), ECG08: 433 MS
QTC CALCULATION (BEZET), ECG08: 447 MS
RBC # BLD AUTO: 4.57 M/UL (ref 4.2–5.3)
RBC # BLD AUTO: 4.89 M/UL (ref 4.2–5.3)
SODIUM SERPL-SCNC: 143 MMOL/L (ref 136–145)
VENTRICULAR RATE, ECG03: 70 BPM
VENTRICULAR RATE, ECG03: 78 BPM
WBC # BLD AUTO: 11 K/UL (ref 4.6–13.2)
WBC # BLD AUTO: 16.9 K/UL (ref 4.6–13.2)

## 2019-05-20 PROCEDURE — C1894 INTRO/SHEATH, NON-LASER: HCPCS | Performed by: INTERNAL MEDICINE

## 2019-05-20 PROCEDURE — C1887 CATHETER, GUIDING: HCPCS | Performed by: INTERNAL MEDICINE

## 2019-05-20 PROCEDURE — C9113 INJ PANTOPRAZOLE SODIUM, VIA: HCPCS | Performed by: FAMILY MEDICINE

## 2019-05-20 PROCEDURE — 99152 MOD SED SAME PHYS/QHP 5/>YRS: CPT | Performed by: INTERNAL MEDICINE

## 2019-05-20 PROCEDURE — 80053 COMPREHEN METABOLIC PANEL: CPT

## 2019-05-20 PROCEDURE — 85025 COMPLETE CBC W/AUTO DIFF WBC: CPT

## 2019-05-20 PROCEDURE — C1874 STENT, COATED/COV W/DEL SYS: HCPCS | Performed by: INTERNAL MEDICINE

## 2019-05-20 PROCEDURE — 74011250636 HC RX REV CODE- 250/636: Performed by: INTERNAL MEDICINE

## 2019-05-20 PROCEDURE — C1753 CATH, INTRAVAS ULTRASOUND: HCPCS | Performed by: INTERNAL MEDICINE

## 2019-05-20 PROCEDURE — 74011250637 HC RX REV CODE- 250/637: Performed by: FAMILY MEDICINE

## 2019-05-20 PROCEDURE — 74011000250 HC RX REV CODE- 250: Performed by: INTERNAL MEDICINE

## 2019-05-20 PROCEDURE — 74011250637 HC RX REV CODE- 250/637: Performed by: INTERNAL MEDICINE

## 2019-05-20 PROCEDURE — 77030029997 HC DEV COM RDL R BND TELE -B: Performed by: INTERNAL MEDICINE

## 2019-05-20 PROCEDURE — 93306 TTE W/DOPPLER COMPLETE: CPT

## 2019-05-20 PROCEDURE — C1769 GUIDE WIRE: HCPCS | Performed by: INTERNAL MEDICINE

## 2019-05-20 PROCEDURE — 77030013797 HC KT TRNSDUC PRSSR EDWD -A: Performed by: INTERNAL MEDICINE

## 2019-05-20 PROCEDURE — 82962 GLUCOSE BLOOD TEST: CPT

## 2019-05-20 PROCEDURE — 65660000000 HC RM CCU STEPDOWN

## 2019-05-20 PROCEDURE — 85018 HEMOGLOBIN: CPT

## 2019-05-20 PROCEDURE — 77030008543 HC TBNG MON PRSS MRTM -A: Performed by: INTERNAL MEDICINE

## 2019-05-20 PROCEDURE — 74011250636 HC RX REV CODE- 250/636

## 2019-05-20 PROCEDURE — 93458 L HRT ARTERY/VENTRICLE ANGIO: CPT | Performed by: INTERNAL MEDICINE

## 2019-05-20 PROCEDURE — 92978 ENDOLUMINL IVUS OCT C 1ST: CPT | Performed by: INTERNAL MEDICINE

## 2019-05-20 PROCEDURE — C1725 CATH, TRANSLUMIN NON-LASER: HCPCS | Performed by: INTERNAL MEDICINE

## 2019-05-20 PROCEDURE — 93005 ELECTROCARDIOGRAM TRACING: CPT

## 2019-05-20 PROCEDURE — 77030004522 HC CATH ANGI DX EXPO BSC -A: Performed by: INTERNAL MEDICINE

## 2019-05-20 PROCEDURE — 85610 PROTHROMBIN TIME: CPT

## 2019-05-20 PROCEDURE — 77030028790 HC ACC ST CTRL VLV COPLT ABBT -B: Performed by: INTERNAL MEDICINE

## 2019-05-20 PROCEDURE — 74011636320 HC RX REV CODE- 636/320: Performed by: INTERNAL MEDICINE

## 2019-05-20 PROCEDURE — 74011000258 HC RX REV CODE- 258: Performed by: INTERNAL MEDICINE

## 2019-05-20 PROCEDURE — 92928 PRQ TCAT PLMT NTRAC ST 1 LES: CPT | Performed by: INTERNAL MEDICINE

## 2019-05-20 PROCEDURE — 86677 HELICOBACTER PYLORI ANTIBODY: CPT

## 2019-05-20 PROCEDURE — 36415 COLL VENOUS BLD VENIPUNCTURE: CPT

## 2019-05-20 PROCEDURE — 85027 COMPLETE CBC AUTOMATED: CPT

## 2019-05-20 PROCEDURE — 74011250636 HC RX REV CODE- 250/636: Performed by: FAMILY MEDICINE

## 2019-05-20 PROCEDURE — 99153 MOD SED SAME PHYS/QHP EA: CPT | Performed by: INTERNAL MEDICINE

## 2019-05-20 DEVICE — XIENCE SIERRA™ EVEROLIMUS ELUTING CORONARY STENT SYSTEM 3.00 MM X 18 MM / RAPID-EXCHANGE
Type: IMPLANTABLE DEVICE | Site: CORONARY | Status: FUNCTIONAL
Brand: XIENCE SIERRA™

## 2019-05-20 RX ORDER — LIDOCAINE HYDROCHLORIDE 10 MG/ML
INJECTION INFILTRATION; PERINEURAL AS NEEDED
Status: DISCONTINUED | OUTPATIENT
Start: 2019-05-20 | End: 2019-05-20 | Stop reason: HOSPADM

## 2019-05-20 RX ORDER — INSULIN LISPRO 100 [IU]/ML
INJECTION, SOLUTION INTRAVENOUS; SUBCUTANEOUS
Status: DISCONTINUED | OUTPATIENT
Start: 2019-05-20 | End: 2019-05-22 | Stop reason: HOSPADM

## 2019-05-20 RX ORDER — OXYCODONE AND ACETAMINOPHEN 5; 325 MG/1; MG/1
2 TABLET ORAL
Status: DISCONTINUED | OUTPATIENT
Start: 2019-05-20 | End: 2019-05-22 | Stop reason: HOSPADM

## 2019-05-20 RX ORDER — SODIUM CHLORIDE 0.9 % (FLUSH) 0.9 %
5-40 SYRINGE (ML) INJECTION AS NEEDED
Status: DISCONTINUED | OUTPATIENT
Start: 2019-05-20 | End: 2019-05-22 | Stop reason: HOSPADM

## 2019-05-20 RX ORDER — SODIUM CHLORIDE 0.9 % (FLUSH) 0.9 %
5-40 SYRINGE (ML) INJECTION EVERY 8 HOURS
Status: DISCONTINUED | OUTPATIENT
Start: 2019-05-20 | End: 2019-05-22 | Stop reason: HOSPADM

## 2019-05-20 RX ORDER — ONDANSETRON 2 MG/ML
4 INJECTION INTRAMUSCULAR; INTRAVENOUS
Status: DISCONTINUED | OUTPATIENT
Start: 2019-05-20 | End: 2019-05-22 | Stop reason: HOSPADM

## 2019-05-20 RX ORDER — TRAMADOL HYDROCHLORIDE 50 MG/1
50 TABLET ORAL
Status: COMPLETED | OUTPATIENT
Start: 2019-05-20 | End: 2019-05-20

## 2019-05-20 RX ORDER — VERAPAMIL HYDROCHLORIDE 2.5 MG/ML
INJECTION, SOLUTION INTRAVENOUS AS NEEDED
Status: DISCONTINUED | OUTPATIENT
Start: 2019-05-20 | End: 2019-05-20 | Stop reason: HOSPADM

## 2019-05-20 RX ORDER — DILTIAZEM HYDROCHLORIDE 240 MG/1
240 CAPSULE, COATED, EXTENDED RELEASE ORAL DAILY
Status: DISCONTINUED | OUTPATIENT
Start: 2019-05-20 | End: 2019-05-22 | Stop reason: HOSPADM

## 2019-05-20 RX ORDER — NITROGLYCERIN 40 MG/100ML
INJECTION INTRAVENOUS
Status: COMPLETED | OUTPATIENT
Start: 2019-05-20 | End: 2019-05-20

## 2019-05-20 RX ORDER — NITROGLYCERIN 400 UG/1
SPRAY ORAL AS NEEDED
Status: DISCONTINUED | OUTPATIENT
Start: 2019-05-20 | End: 2019-05-20 | Stop reason: HOSPADM

## 2019-05-20 RX ORDER — ATROPINE SULFATE 0.1 MG/ML
0.5 INJECTION INTRAVENOUS AS NEEDED
Status: DISCONTINUED | OUTPATIENT
Start: 2019-05-20 | End: 2019-05-22 | Stop reason: HOSPADM

## 2019-05-20 RX ORDER — NITROGLYCERIN 0.4 MG/1
0.4 TABLET SUBLINGUAL
Status: ACTIVE | OUTPATIENT
Start: 2019-05-20 | End: 2019-05-21

## 2019-05-20 RX ORDER — ONDANSETRON 2 MG/ML
INJECTION INTRAMUSCULAR; INTRAVENOUS
Status: COMPLETED
Start: 2019-05-20 | End: 2019-05-20

## 2019-05-20 RX ORDER — GUAIFENESIN 100 MG/5ML
81 LIQUID (ML) ORAL DAILY
Status: DISCONTINUED | OUTPATIENT
Start: 2019-05-21 | End: 2019-05-22 | Stop reason: HOSPADM

## 2019-05-20 RX ORDER — FENTANYL CITRATE 50 UG/ML
INJECTION, SOLUTION INTRAMUSCULAR; INTRAVENOUS AS NEEDED
Status: DISCONTINUED | OUTPATIENT
Start: 2019-05-20 | End: 2019-05-20 | Stop reason: HOSPADM

## 2019-05-20 RX ORDER — ATORVASTATIN CALCIUM 20 MG/1
40 TABLET, FILM COATED ORAL
Status: DISCONTINUED | OUTPATIENT
Start: 2019-05-20 | End: 2019-05-22 | Stop reason: HOSPADM

## 2019-05-20 RX ORDER — HEPARIN SODIUM 1000 [USP'U]/ML
INJECTION, SOLUTION INTRAVENOUS; SUBCUTANEOUS AS NEEDED
Status: DISCONTINUED | OUTPATIENT
Start: 2019-05-20 | End: 2019-05-20 | Stop reason: HOSPADM

## 2019-05-20 RX ORDER — HEPARIN SODIUM 200 [USP'U]/100ML
INJECTION, SOLUTION INTRAVENOUS
Status: COMPLETED | OUTPATIENT
Start: 2019-05-20 | End: 2019-05-20

## 2019-05-20 RX ORDER — MIDAZOLAM HYDROCHLORIDE 1 MG/ML
INJECTION, SOLUTION INTRAMUSCULAR; INTRAVENOUS AS NEEDED
Status: DISCONTINUED | OUTPATIENT
Start: 2019-05-20 | End: 2019-05-20 | Stop reason: HOSPADM

## 2019-05-20 RX ORDER — LOSARTAN POTASSIUM 25 MG/1
25 TABLET ORAL DAILY
Status: DISCONTINUED | OUTPATIENT
Start: 2019-05-20 | End: 2019-05-22 | Stop reason: HOSPADM

## 2019-05-20 RX ADMIN — RANITIDINE 150 MG: 150 TABLET ORAL at 21:37

## 2019-05-20 RX ADMIN — NITROGLYCERIN 0.4 MG: 0.4 TABLET, ORALLY DISINTEGRATING SUBLINGUAL at 01:51

## 2019-05-20 RX ADMIN — GABAPENTIN 300 MG: 300 CAPSULE ORAL at 21:37

## 2019-05-20 RX ADMIN — LOSARTAN POTASSIUM 25 MG: 25 TABLET ORAL at 14:43

## 2019-05-20 RX ADMIN — DILTIAZEM HYDROCHLORIDE 240 MG: 240 CAPSULE, COATED, EXTENDED RELEASE ORAL at 14:43

## 2019-05-20 RX ADMIN — ONDANSETRON 4 MG: 4 TABLET, ORALLY DISINTEGRATING ORAL at 12:38

## 2019-05-20 RX ADMIN — Medication 10 ML: at 21:48

## 2019-05-20 RX ADMIN — RANITIDINE 150 MG: 150 TABLET ORAL at 06:58

## 2019-05-20 RX ADMIN — NITROGLYCERIN 0.4 MG: 0.4 TABLET, ORALLY DISINTEGRATING SUBLINGUAL at 08:01

## 2019-05-20 RX ADMIN — PANTOPRAZOLE SODIUM 40 MG: 40 INJECTION, POWDER, FOR SOLUTION INTRAVENOUS at 21:37

## 2019-05-20 RX ADMIN — ONDANSETRON 4 MG: 2 INJECTION INTRAMUSCULAR; INTRAVENOUS at 16:45

## 2019-05-20 RX ADMIN — HEPARIN SODIUM 5000 UNITS: 5000 INJECTION INTRAVENOUS; SUBCUTANEOUS at 22:04

## 2019-05-20 RX ADMIN — TRAMADOL HYDROCHLORIDE 50 MG: 50 TABLET, FILM COATED ORAL at 10:49

## 2019-05-20 RX ADMIN — CLONIDINE HYDROCHLORIDE 0.2 MG: 0.1 TABLET ORAL at 21:37

## 2019-05-20 RX ADMIN — FERROUS SULFATE TAB 325 MG (65 MG ELEMENTAL FE) 325 MG: 325 (65 FE) TAB at 06:58

## 2019-05-20 RX ADMIN — OXYCODONE HYDROCHLORIDE AND ACETAMINOPHEN 2 TABLET: 5; 325 TABLET ORAL at 14:42

## 2019-05-20 NOTE — PROGRESS NOTES
0700: Assumed care of Madina Adler RN.   6229: pt c/o chest pain after dr left the room. Gave nitro as ordered. Notified Dr. Emily Ramires. Pt off floor to cath lab with Starr Santos [id number].  0330: pt back from cath lab. Pt had episode of vomiting. Called PA for Zofran IV order. Administered to pt. Will reassess. 1700: Pt resting no sign of distress. Pt resting no c/o of nausea. 1800: changed pt gown. And helped pt get clean. Site look goods no drainage or swelling. Palpable pulses.

## 2019-05-20 NOTE — PROGRESS NOTES
Dr Gifty Bush to care unit requesting cath for pt on floor to be done first this am.  Zulema Swartz to 3rd floor with physician,  Physician spoke with pt,  Reinforced what md had stated, consent form signed,  Pt called gaurav to update situation and have her come to hospital.  Pt partially prepped on floor and finished prepped in cath lab. Gaurav arrived a hosp prior to procedure,  To cath lab for to see pt prior to procedure

## 2019-05-20 NOTE — PROGRESS NOTES
Pt returned to care unit aaox3,  Complaining of chest and head pain 10/10 Dr Gini Baker aware, repeat ekg done,  Pt placed on bedpan voided clear yellow urine without incident

## 2019-05-20 NOTE — ROUTINE PROCESS
Cardiac Cath Lab:  Pre Procedure Chart Check Patients chart was accessed and reviewed for possible and/or scheduled procedure. Creatinine Clearance: 
Serum creatinine: 0.74 mg/dL 05/20/19 0810 Estimated creatinine clearance: 77.8 mL/min Total Contrast  Load: 
3 x estimated clearance amount=  233ml 75% of Contrast Load: 0.75 x Total Contrast Load=    175ml Recent Labs  
  05/20/19 
0810 05/19/19 
1934 WBC 11.0  --   
RBC 4.57  --   
HCT 33.0*  --   
HGB 10.0*  --   
*  --   
INR 1.0  --   
PTP 13.1  --   
  --   
K 3.5  --   
BUN 10  --   
CREA 0.74  --   
GFRAA >60  --   
GFRNA >60  --   
CA 9.4  --   
CPK  --  68  
CKMB  --  <1.0 CKND1  --  CALCULATION NOT PERFORMED WHEN RESULT IS BELOW LINEAR LIMIT  
TROIQ  --  0.07* BMI: Body mass index is 29.7 kg/m². ALLERGIES:  
Allergies Allergen Reactions  Acetaminophen Itching Restless legs  Vicodin [Hydrocodone-Acetaminophen] Itching Lines: 
  
  
Peripheral IV 05/19/19 Left Antecubital (Active) Site Assessment Clean, dry, & intact 5/20/2019  8:47 AM  
Phlebitis Assessment 0 5/20/2019  8:47 AM  
Infiltration Assessment 0 5/20/2019  8:47 AM  
Dressing Status Clean, dry, & intact 5/20/2019  8:47 AM  
Dressing Type Transparent 5/20/2019  8:47 AM  
Hub Color/Line Status Pink 5/20/2019  8:47 AM  
Action Taken Open ports on tubing capped 5/20/2019  4:20 AM  
Alcohol Cap Used Yes 5/20/2019  8:47 AM  
 
Sheath 05/20/19 (Active) History: 
 
Past Medical History:  
Diagnosis Date  Arrhythmia 1990's SVT- from low potassium  Chronic pain   
 neck, back, left shoulder  Diabetes [facility]) Summer 2018  GERD (gastroesophageal reflux disease)  Hypercholesterolemia   
 taken off medication 1990's  Hypertension late 1970's  Insomnia  Menopause  Neuropathy  OA (osteoarthritis)  Psychiatric disorder   
 depression, anxiety  Sleep apnea   
 uses CPAP Past Surgical History: Procedure Laterality Date  HX BREAST BIOPSY    
 right  HX CERVICAL FUSION  2012  
 anterior  HX GI  2017  
 colonoscopy  HX HYSTERECTOMY  1991  
 HX KNEE ARTHROSCOPY  2011  
 right  HX OTHER SURGICAL    
 hemorrhoidectomy  HX SHOULDER ARTHROSCOPY    
 left  HX TONSILLECTOMY  GIORGI BIOPSY BREAST STEREOTACTIC    
 right Patient Active Problem List  
Diagnosis Code  DM (diabetes mellitus) ([facility] E11.9  
 HTN (hypertension) I10  
 Elevated cholesterol E78.00  
 History of angina Z86.79  
 Cervical spondylosis M47.812  Cervical spinal stenosis M48.02  
 Rotator cuff impingement syndrome of left shoulder M75.42  
 Diabetes (HCC) E11.9  Acute chest pain R07.9  NSTEMI (non-ST elevated myocardial infarction) ([facility] I21.4

## 2019-05-20 NOTE — PROGRESS NOTES
Pt medicated for headache with 2 percocets. medication also given for hypertension,. Pt voided 400cc clear yellow urine on bedpan without incident.

## 2019-05-20 NOTE — ROUTINE PROCESS
Bedside and Verbal shift change report given to TALIA Snell RN (oncoming nurse) by CURT Tomlinson RN (offgoing nurse). Report included the following information SBAR, Kardex, Intake/Output, MAR and Recent Results.

## 2019-05-20 NOTE — PROGRESS NOTES
Pt reeducated on what was found during procedure. Cardiac craterization procedural video shown. Questions answered

## 2019-05-20 NOTE — PROGRESS NOTES
Cm has attempted to meet with patient however she off unit for testing will attempt to meet at another time

## 2019-05-20 NOTE — H&P
Date of Surgery Update: 
Aissatou Contreras was seen and examined. History and physical has been reviewed. The patient has been examined. There have been no significant clinical changes since the completion of the originally dated History and Physical.  Plan cath with moderate sedation.   
 
Signed By: Cheng Carey MD   
 May 20, 2019 8:18 AM

## 2019-05-20 NOTE — PROGRESS NOTES
1940 Received bedside shift report from TALIA Patrick. 2008 Pt reported chest pain post procedure. Pain medicine not due. Nurse reassessed patient once pain medicine was due. Patient reported no pain. 2138 Patient refused Lipitor. Agreed to take the rest of her scheduled medications. Patient on 2 L for comfort. Patient O2 sat is above 95%. Patient was educated on oxygen use as a drug and agreed to titrate down. Pt is now off O2 and saturation     0715 Bedside shift change report given to TALIA Shirley RN (oncoming nurse) by Kenya Schmidt. Dontae Torres RN (offgoing nurse). Report included the following information SBAR and Kardex.

## 2019-05-20 NOTE — PROGRESS NOTES
Pt given packet with stent card, stent information packet. Cardiac referral form, 30 day supply of brilinta, and brilinta information packet.

## 2019-05-20 NOTE — PROGRESS NOTES
Pt states pain in head and chest  Returning, states normally takes oxycodone at home,  No current medication orders noted. Dr Zaida Monte consulted. Nitro drip discontinued.

## 2019-05-20 NOTE — ROUTINE PROCESS
Bedside shift change report given to [address] Rojo (oncoming nurse) by Cady Sanchez RN (offgoing nurse). Report included the following information SBAR, Kardex, Intake/Output and MAR.

## 2019-05-20 NOTE — PROGRESS NOTES
2010:  Assessment completed. Patient moved to room 331 due to no hot water in room 334. Patient  With multiple questions regarding lab tests and further testing. Explanations provided by this RN. Patient verbalized understanding. 2206:  Medications administered to patient at this time. Patient with numerous questions that were answered by this RN regarding medications and side effects. Ordered medications, their indications and side effects were discussed with patient. Patient seems very anxious and skeptical about taking medications or having tests performed. Unable to provide patient with educational tablet for cardiac stents at this time as the battery needs to charge. Will provide to patient when charged. Patient given all scheduled medications except heparin SC and lipitor which she refused at this time. Patient continues to states, \"I'm not sure I need this heart cath, I think it's my stomach. \"  This RN encouraged to follow the guidance of the MD's at this time. 0015:  Reassessment completed. Patient resting quietly in bed with eyes closed. No apparent needs noted. 0149:  Called to room by patient who c/o epigastric pain that she rated \"30\" out of 10. Patient noted to be lying on right side. Asked patient to roll onto left side which she did. 0150:  Returned to room with VS machine and patient stated that rolling onto left side provided some relief. Did not give a numerical value, but states, \"It seems to be easing off some. \"  VS as charted. 0151:  0.4 mg SL Nitro given per PRN orders. 7153:  Patient now rates pain as 8/10. States, \"it's a whole lot better. \"  2nd SL Nitro offered but patient states, \"Not yet. Let's give it another few minutes. \"  
 
5251:  VS as charted. Patient still refusing a second dose of SL Nitro because she states the pain is continuing to get better. 0205:  VS as charted.   Patient states that the pain is a 1/10, declines any further intervention at this time. Remains lying on left side. No further needs stated.

## 2019-05-20 NOTE — PROGRESS NOTES
Pt voided approx 400 cc clear yellow urine on bedpan without incident. zofran po given for nausea,  Head ache gone. Chest pressure 4 at present. Lunch given

## 2019-05-20 NOTE — PROGRESS NOTES
Hospitalist Progress Note Patient: Maryanne Bobo MRN: [id number]  CSN: [id number] YOB: 1955  Age: 59 y.o. Sex: female DOA: 5/19/2019 LOS:  LOS: 1 day Chief Complaint: 
 
HA, CP, wrist pain Assessment/Plan 1. NSTEMI with chest pain 2. Headache 3. Hypertension 4. Hx DM2 1. Patient is status post cardiac cath with successful PCI of proximal LAD. Per cardiology, plan to return to cath lab on 5/22 for further stenting. Patient complaining of mild chest pain after procedure. Echocardiogram has been completed and pending reading. Given nitro which she states has helped. 2. Continue to monitor post-procedure. No changes at this time. 3. Continue Catapres. Blood pressure is elevated post procedure. Patient also on Cardizem CD 420mg daily. Continue to monitor with routine vitals. 4. Continue monitoring blood glucose levels. Utilize sliding scale insulin and adjust accordingly. In December 2018 the patient's hemoglobin A1c was 7.1%. DVT prophylaxis with sequential compression devices. Disposition: Await further stenting on 5/22 per cardiology. Disposition afterwards to home. Patient Active Problem List  
Diagnosis Code  DM (diabetes mellitus) ([facility] E11.9  
 HTN (hypertension) I10  
 Elevated cholesterol E78.00  
 History of angina Z86.79  
 Cervical spondylosis M47.812  Cervical spinal stenosis M48.02  
 Rotator cuff impingement syndrome of left shoulder M75.42  
 Diabetes (HCC) E11.9  Acute chest pain R07.9  NSTEMI (non-ST elevated myocardial infarction) ([facility] [address] I21.4 Subjective: 
 
Says she is having some chest pains and headache. Having some nausea as well. Review of systems: 
 
Constitutional: denies fevers, chills, myalgias Respiratory: denies SOB, cough Cardiovascular: +chest pain, -palpitations Gastrointestinal: +nausea, -vomiting, -diarrhea Vital signs/Intake and Output: 
Visit Vitals BP (!) 164/103 Pulse 85 Temp 98.2 °F (36.8 °C) Resp 17 Ht 5' 4\" (1.626 m) Wt 78.5 kg (173 lb) SpO2 98% Breastfeeding? No  
BMI 29.70 kg/m² Current Shift:  No intake/output data recorded. Last three shifts:  No intake/output data recorded. Exam: 
 
General: Elderly appearing AA female, alert, NAD, OX3 Head/Neck: NCAT, supple, No masses, No lymphadenopathy CVS:Regular rate and rhythm, no M/R/G, S1/S2 heard, no thrill Lungs:Clear to auscultation bilaterally, no wheezes, rhonchi, or rales Abdomen: Soft, Nontender, No distention, Normal Bowel sounds, No hepatomegaly Extremities: No C/C/E, pulses palpable 2+. Radial band applied to right arm. Skin:normal texture and turgor, no rashes, no lesions Neuro:grossly normal , follows commands Psych:appropriate Labs: Results:  
   
Chemistry Recent Labs  
  05/20/19 
3751 05/19/19 
6643 * 159*  143  
K 3.5 3.4*  
* 109* CO2 27 23 BUN 10 12 CREA 0.74 1.02  
CA 9.4 9.7 AGAP 7 11 BUCR 14 12 * 128* TP 6.6 7.0 ALB 3.3* 3.5 GLOB 3.3 3.5 AGRAT 1.0 1.0  
  
CBC w/Diff Recent Labs  
  05/20/19 
0847 05/20/19 
0810 05/19/19 
3893 WBC  --  11.0 15.0*  
RBC  --  4.57 4.93  
HGB 9.8* 10.0* 11.0*  
HCT 32.3* 33.0* 35.4 PLT  --  429* 471* GRANS  --  53 61 LYMPH  --  37 28 EOS  --  3 4 Cardiac Enzymes Recent Labs 05/19/19 
1934 05/19/19 
1351 CPK 68 66 CKND1 CALCULATION NOT PERFORMED WHEN RESULT IS BELOW LINEAR LIMIT CALCULATION NOT PERFORMED WHEN RESULT IS BELOW LINEAR LIMIT Coagulation Recent Labs  
  05/20/19 
0810 PTP 13.1 INR 1.0 Lipid Panel No results found for: CHOL, CHOLPOCT, CHOLX, CHLST, CHOLV, 716662, HDL, LDL, LDLC, DLDLP, 143982, VLDLC, VLDL, TGLX, TRIGL, TRIGP, TGLPOCT, CHHD, CHHDX  
BNP No results for input(s): BNPP in the last 72 hours. Liver Enzymes Recent Labs  
  05/20/19 
0810 TP 6.6 ALB 3.3* * SGOT 10* Thyroid Studies No results found for: T4, T3U, TSH, TSHEXT Procedures/imaging: see electronic medical records for all procedures/Xrays and details which were not copied into this note but were reviewed prior to creation of Plan Lianne Calvillo PA-C

## 2019-05-21 LAB
ANION GAP SERPL CALC-SCNC: 9 MMOL/L (ref 3–18)
ATRIAL RATE: 73 BPM
BUN SERPL-MCNC: 6 MG/DL (ref 7–18)
BUN/CREAT SERPL: 8 (ref 12–20)
CALCIUM SERPL-MCNC: 9.1 MG/DL (ref 8.5–10.1)
CALCULATED P AXIS, ECG09: 64 DEGREES
CALCULATED R AXIS, ECG10: 17 DEGREES
CALCULATED T AXIS, ECG11: -122 DEGREES
CHLORIDE SERPL-SCNC: 107 MMOL/L (ref 100–108)
CHOLEST SERPL-MCNC: 199 MG/DL
CO2 SERPL-SCNC: 27 MMOL/L (ref 21–32)
CREAT SERPL-MCNC: 0.76 MG/DL (ref 0.6–1.3)
DIAGNOSIS, 93000: NORMAL
GLUCOSE BLD STRIP.AUTO-MCNC: 101 MG/DL (ref 70–110)
GLUCOSE BLD STRIP.AUTO-MCNC: 126 MG/DL (ref 70–110)
GLUCOSE BLD STRIP.AUTO-MCNC: 133 MG/DL (ref 70–110)
GLUCOSE BLD STRIP.AUTO-MCNC: 99 MG/DL (ref 70–110)
GLUCOSE SERPL-MCNC: 94 MG/DL (ref 74–99)
H PYLORI IGA SER-ACNC: <9 UNITS (ref 0–8.9)
H PYLORI IGG SER IA-ACNC: <0.8 INDEX VALUE (ref 0–0.79)
H PYLORI IGM SER-ACNC: <9 UNITS (ref 0–8.9)
HDLC SERPL-MCNC: 35 MG/DL (ref 40–60)
HDLC SERPL: 5.7 {RATIO} (ref 0–5)
LDLC SERPL CALC-MCNC: 142.8 MG/DL (ref 0–100)
LIPID PROFILE,FLP: ABNORMAL
P-R INTERVAL, ECG05: 180 MS
POTASSIUM SERPL-SCNC: 3.5 MMOL/L (ref 3.5–5.5)
Q-T INTERVAL, ECG07: 458 MS
QRS DURATION, ECG06: 86 MS
QTC CALCULATION (BEZET), ECG08: 504 MS
SODIUM SERPL-SCNC: 143 MMOL/L (ref 136–145)
TRIGL SERPL-MCNC: 106 MG/DL (ref ?–150)
VENTRICULAR RATE, ECG03: 73 BPM
VLDLC SERPL CALC-MCNC: 21.2 MG/DL

## 2019-05-21 PROCEDURE — 80048 BASIC METABOLIC PNL TOTAL CA: CPT

## 2019-05-21 PROCEDURE — 93005 ELECTROCARDIOGRAM TRACING: CPT

## 2019-05-21 PROCEDURE — 77010033678 HC OXYGEN DAILY

## 2019-05-21 PROCEDURE — 74011250637 HC RX REV CODE- 250/637: Performed by: FAMILY MEDICINE

## 2019-05-21 PROCEDURE — 65660000000 HC RM CCU STEPDOWN

## 2019-05-21 PROCEDURE — 80061 LIPID PANEL: CPT

## 2019-05-21 PROCEDURE — C9113 INJ PANTOPRAZOLE SODIUM, VIA: HCPCS | Performed by: FAMILY MEDICINE

## 2019-05-21 PROCEDURE — 36415 COLL VENOUS BLD VENIPUNCTURE: CPT

## 2019-05-21 PROCEDURE — 74011250637 HC RX REV CODE- 250/637: Performed by: INTERNAL MEDICINE

## 2019-05-21 PROCEDURE — 74011250636 HC RX REV CODE- 250/636: Performed by: FAMILY MEDICINE

## 2019-05-21 PROCEDURE — 82962 GLUCOSE BLOOD TEST: CPT

## 2019-05-21 RX ADMIN — Medication 10 ML: at 22:30

## 2019-05-21 RX ADMIN — HEPARIN SODIUM 5000 UNITS: 5000 INJECTION INTRAVENOUS; SUBCUTANEOUS at 16:54

## 2019-05-21 RX ADMIN — FERROUS SULFATE TAB 325 MG (65 MG ELEMENTAL FE) 325 MG: 325 (65 FE) TAB at 06:59

## 2019-05-21 RX ADMIN — RANITIDINE 150 MG: 150 TABLET ORAL at 06:59

## 2019-05-21 RX ADMIN — GABAPENTIN 300 MG: 300 CAPSULE ORAL at 22:24

## 2019-05-21 RX ADMIN — RANITIDINE 150 MG: 150 TABLET ORAL at 17:33

## 2019-05-21 RX ADMIN — PANTOPRAZOLE SODIUM 40 MG: 40 INJECTION, POWDER, FOR SOLUTION INTRAVENOUS at 22:24

## 2019-05-21 RX ADMIN — Medication 10 ML: at 14:00

## 2019-05-21 RX ADMIN — VALACYCLOVIR HYDROCHLORIDE 500 MG: 500 TABLET, FILM COATED ORAL at 08:54

## 2019-05-21 RX ADMIN — LOSARTAN POTASSIUM 25 MG: 25 TABLET ORAL at 08:54

## 2019-05-21 RX ADMIN — POTASSIUM CHLORIDE 20 MEQ: 20 TABLET, EXTENDED RELEASE ORAL at 06:59

## 2019-05-21 RX ADMIN — CLONIDINE HYDROCHLORIDE 0.2 MG: 0.1 TABLET ORAL at 08:53

## 2019-05-21 RX ADMIN — Medication 1 CAPSULE: at 08:53

## 2019-05-21 RX ADMIN — DILTIAZEM HYDROCHLORIDE 240 MG: 240 CAPSULE, COATED, EXTENDED RELEASE ORAL at 08:53

## 2019-05-21 RX ADMIN — HEPARIN SODIUM 5000 UNITS: 5000 INJECTION INTRAVENOUS; SUBCUTANEOUS at 06:59

## 2019-05-21 RX ADMIN — ATORVASTATIN CALCIUM 40 MG: 20 TABLET, FILM COATED ORAL at 22:23

## 2019-05-21 RX ADMIN — TICAGRELOR 90 MG: 90 TABLET ORAL at 22:23

## 2019-05-21 RX ADMIN — CLONIDINE HYDROCHLORIDE 0.2 MG: 0.1 TABLET ORAL at 22:23

## 2019-05-21 RX ADMIN — GABAPENTIN 300 MG: 300 CAPSULE ORAL at 08:53

## 2019-05-21 RX ADMIN — ASPIRIN 81 MG 81 MG: 81 TABLET ORAL at 08:53

## 2019-05-21 NOTE — PROGRESS NOTES
Problem: Falls - Risk of  Goal: *Absence of Falls  Description  Document jV Arreguin Fall Risk and appropriate interventions in the flowsheet.   Outcome: Progressing Towards Goal     Problem: Patient Education: Go to Patient Education Activity  Goal: Patient/Family Education  Outcome: Progressing Towards Goal     Problem: Patient Education: Go to Patient Education Activity  Goal: Patient/Family Education  Outcome: Progressing Towards Goal     Problem: Cath Lab Procedures: Pre-Procedure  Goal: Activity/Safety  Outcome: Progressing Towards Goal  Goal: Diagnostic Test/Procedures  Outcome: Progressing Towards Goal  Goal: Nutrition/Diet  Outcome: Progressing Towards Goal  Goal: Medications  Outcome: Progressing Towards Goal  Goal: Respiratory  Outcome: Progressing Towards Goal  Goal: Treatments/Interventions/Procedures  Outcome: Progressing Towards Goal  Goal: Psychosocial  Outcome: Progressing Towards Goal  Goal: *Verbalize description of procedure  Outcome: Progressing Towards Goal  Goal: *Consent signed  Outcome: Progressing Towards Goal     Problem: Pain  Goal: *Control of Pain  Outcome: Progressing Towards Goal  Goal: *PALLIATIVE CARE:  Alleviation of Pain  Outcome: Progressing Towards Goal     Problem: Patient Education: Go to Patient Education Activity  Goal: Patient/Family Education  Outcome: Progressing Towards Goal

## 2019-05-21 NOTE — PROGRESS NOTES
INITIAL NUTRITION ASSESSMENT     RECOMMENDATIONS/PLAN:   Edu: pt will need cardiac diet edu before d/c  Monitor labs/lytes, PO intakes, skin integrity, wt, fluid status, BM    REASON FOR ASSESSMENT:     []  RN Referral:    [x] MST score >/=2  Malnutrition Screening Tool (MST):  Recently Lost Weight Without Trying: Yes  If Yes, How Much Weight Loss: 2-13 lbs  Eating Poorly Due to Decreased Appetite: Yes  MST Score: 2      NUTRITION ASSESSMENT:   Client History: 59 yrs old Female admitted with acute chest pain-NSTEMI-PCI of RCA tomorrow morining per cardiology, elevated cholesterol, HTN, DM     PMHx: arrhythmia, chronic pain, DM, GERD, hypercholesterolemia, HTN, insomnia, menopause, neuropathy, OA, depression, anxiety, sleep apnea    Cultural/Baptism Food Preferences: None Identified    FOOD/NUTRITION HISTORY  Diet History: pt lives with niece's family; they eat mostly vegetarian so she will come down after they eat, sometimes she will eat a bowl of cereal, what is left over, fruit, sandwich, she does not cook because no one will eat the way she cooks and food will go to waste. Let pt know about HDL and LDL levels pt was confused on what they were; asked pt if she be willing for diet edu she agreed will do before d/c    Food Allergies:  [x] NKFA     Pertinent PTA Medications: iron, fish oil, KCl     NUTRITION INTAKE   Diet Order:  AHA     Average PO Intake:       No data found.    Pertinent Medications:  [x] Reviewed; iron, fish oil, protonix, KCl,   Electrolyte Replacement Protocol: []K  []Mg  []PO4    Insulin:  [] SSI  [x] Pre-meal   []  Basal   [] Drip  [] None  Pt expected to meet estimated nutrient needs through next review:          [x]  Yes     [] No;  ANTHROPOMETRICS  Height: 5' 4\" (162.6 cm)       Weight: 77.2 kg (170 lb 3.2 oz)    BMI: 29.2 kg/m^2  -  overweight (25.0%-29.9% BMI)        Weight change: wt loss noted in chart but not significant                                   Comparison to Reference Standards:  IBW: 120 lbs      %IBW: 142%      AdjBW: n/a    NUTRITION-FOCUSED PHYSICAL ASSESSMENT  Skin: Intact. GI: No BM    BIOCHEMICAL DATA & MEDICAL TESTS  Pertinent Labs:  [x] Reviewed; HDL-35 LDL-142.8     NUTRITION PRESCRIPTION  Calories: 1580 kcal/day based on [place] x 1.3  Protein: 44-55 g/day based on 0.8-1.0 g/kg of IBW  CHO: 198 g/day based on 50% of total energy  Fluid: 1580 ml/day based on 1 kcal/ml      NUTRITION DIAGNOSES:   1. Knowledge related nutrition deficit related to cardiac diet as evidence by pt agreeing to diet edu and not knowing what HDL or LDL cholesterol is when lab values were stated. NUTRITION INTERVENTIONS:   INTERVENTIONS:        GOALS:  1. Edu: Cardiac diet edu  1. Choose foods lower in salt by next review 6 days     LEARNING NEEDS (Diet, Supplementation, Food/Nutrient-Drug Interaction):   [] None Identified  [] Inpatient education provided/documented    [] Identified and patient:  [] Declined     [x] Was not appropriate/indicated  NUTRITION MONITORING /EVALUATION:   Follow PO intake  Monitor wt  Monitor renal labs, electrolytes, fluid status    [] Participated in Interdisciplinary Rounds  [x] [address] [place] [address] Reviewed/Documented  DISCHARGE NUTRITION RECOMMENDATIONS ADDRESSED:        [x] To be determined closer to discharge    NUTRITION RISK:     []  At risk                     [x]  Not currently at risk     Will follow-up per policy.   Lizzy Starr 1

## 2019-05-21 NOTE — PROGRESS NOTES
Cardiology Progress Note      5/20/2019 8:30 PM    Admit Date: 5/19/2019    Admit Diagnosis: Acute chest pain [R07.9]  Diabetes ([address] [E11.9]      Subjective:     Vernadine Hailee denies chest pain. Visit Vitals  BP (!) 148/94 (BP 1 Location: Left arm, BP Patient Position: Sitting)   Pulse 86   Temp 98.2 °F (36.8 °C)   Resp 20   Ht 5' 4\" (1.626 m)   Wt 78.5 kg (173 lb)   SpO2 100%   Breastfeeding?  No   BMI 29.70 kg/m²     Current Facility-Administered Medications   Medication Dose Route Frequency    pantoprazole (PROTONIX) 40 mg in sodium chloride 0.9% 10 mL injection  40 mg IntraVENous QHS    sodium chloride (NS) flush 5-40 mL  5-40 mL IntraVENous Q8H    sodium chloride (NS) flush 5-40 mL  5-40 mL IntraVENous PRN    [START ON 5/21/2019] aspirin chewable tablet 81 mg  81 mg Oral DAILY    atropine injection 0.5 mg  0.5 mg IntraVENous PRN    nitroglycerin (NITROSTAT) tablet 0.4 mg  0.4 mg SubLINGual Q5MIN PRN    [START ON 5/21/2019] ticagrelor (BRILINTA) tablet 90 mg  90 mg Oral BID    atorvastatin (LIPITOR) tablet 40 mg  40 mg Oral QHS    dilTIAZem CD (CARDIZEM CD) capsule 240 mg  240 mg Oral DAILY    losartan (COZAAR) tablet 25 mg  25 mg Oral DAILY    oxyCODONE-acetaminophen (PERCOCET) 5-325 mg per tablet 2 Tab  2 Tab Oral Q6H PRN    ondansetron (ZOFRAN) injection 4 mg  4 mg IntraVENous Q4H PRN    insulin lispro (HUMALOG) injection   SubCUTAneous AC&HS    heparin (porcine) injection 5,000 Units  5,000 Units SubCUTAneous Q8H    cloNIDine HCl (CATAPRES) tablet 0.2 mg  0.2 mg Oral BID    ferrous sulfate tablet 325 mg  1 Tab Oral ACB    gabapentin (NEURONTIN) capsule 300 mg  300 mg Oral BID    potassium chloride (K-DUR, KLOR-CON) SR tablet 20 mEq  20 mEq Oral EVERY OTHER DAY    valACYclovir (VALTREX) tablet 500 mg  500 mg Oral DAILY    glucose chewable tablet 16 g  16 g Oral PRN    glucagon (GLUCAGEN) injection 1 mg  1 mg IntraMUSCular PRN    dextrose (D50W) injection syrg 12.5-25 g  25-50 mL IntraVENous PRN    fish oil-omega-3 fatty acids 340-1,000 mg capsule 1 Cap  1 Cap Oral DAILY    raNITIdine (ZANTAC) tablet 150 mg  150 mg Oral BID    nitroglycerin (NITROSTAT) tablet 0.4 mg  0.4 mg SubLINGual PRN         Objective:      Physical Exam:  Visit Vitals  BP (!) 148/94 (BP 1 Location: Left arm, BP Patient Position: Sitting)   Pulse 86   Temp 98.2 °F (36.8 °C)   Resp 20   Ht 5' 4\" (1.626 m)   Wt 78.5 kg (173 lb)   SpO2 100%   Breastfeeding? No   BMI 29.70 kg/m²     General Appearance:  Well developed, well nourished,alert and oriented x 3, and individual in no acute distress. Ears/Nose/Mouth/Throat:   Hearing grossly normal.         Neck: Supple. Chest:   Lungs clear to auscultation bilaterally. Cardiovascular:  Regular rate and rhythm, S1, S2 normal, no murmur. Abdomen:   Soft, non-tender, bowel sounds are active. Extremities: No edema bilaterally. Skin: Warm and dry. Data Review:   Labs:    Recent Results (from the past 24 hour(s))   GLUCOSE, POC    Collection Time: 05/19/19  9:54 PM   Result Value Ref Range    Glucose (POC) 138 (H) 70 - 110 mg/dL   GLUCOSE, POC    Collection Time: 05/20/19  6:54 AM   Result Value Ref Range    Glucose (POC) 117 (H) 70 - 110 mg/dL   CBC WITH AUTOMATED DIFF    Collection Time: 05/20/19  8:10 AM   Result Value Ref Range    WBC 11.0 4.6 - 13.2 K/uL    RBC 4.57 4.20 - 5.30 M/uL    HGB 10.0 (L) 12.0 - 16.0 g/dL    HCT 33.0 (L) 35.0 - 45.0 %    MCV 72.2 (L) 74.0 - 97.0 FL    MCH 21.9 (L) 24.0 - 34.0 PG    MCHC 30.3 (L) 31.0 - 37.0 g/dL    RDW 16.7 (H) 11.6 - 14.5 %    PLATELET 778 (H) 137 - 420 K/uL    MPV 8.4 (L) 9.2 - 11.8 FL    NEUTROPHILS 53 40 - 73 %    LYMPHOCYTES 37 21 - 52 %    MONOCYTES 7 3 - 10 %    EOSINOPHILS 3 0 - 5 %    BASOPHILS 0 0 - 2 %    ABS. NEUTROPHILS 5.8 1.8 - 8.0 K/UL    ABS. LYMPHOCYTES 4.1 (H) 0.9 - 3.6 K/UL    ABS. MONOCYTES 0.8 0.05 - 1.2 K/UL    ABS. EOSINOPHILS 0.4 0.0 - 0.4 K/UL    ABS.  BASOPHILS 0.0 0.0 - 0.1 K/UL DF AUTOMATED     METABOLIC PANEL, COMPREHENSIVE    Collection Time: 05/20/19  8:10 AM   Result Value Ref Range    Sodium 143 136 - 145 mmol/L    Potassium 3.5 3.5 - 5.5 mmol/L    Chloride 109 (H) 100 - 108 mmol/L    CO2 27 21 - 32 mmol/L    Anion gap 7 3.0 - 18 mmol/L    Glucose 119 (H) 74 - 99 mg/dL    BUN 10 7.0 - 18 MG/DL    Creatinine 0.74 0.6 - 1.3 MG/DL    BUN/Creatinine ratio 14 12 - 20      GFR est AA >60 >60 ml/min/1.73m2    GFR est non-AA >60 >60 ml/min/1.73m2    Calcium 9.4 8.5 - 10.1 MG/DL    Bilirubin, total 0.2 0.2 - 1.0 MG/DL    ALT (SGPT) 17 13 - 56 U/L    AST (SGOT) 10 (L) 15 - 37 U/L    Alk.  phosphatase 118 (H) 45 - 117 U/L    Protein, total 6.6 6.4 - 8.2 g/dL    Albumin 3.3 (L) 3.4 - 5.0 g/dL    Globulin 3.3 2.0 - 4.0 g/dL    A-G Ratio 1.0 0.8 - 1.7     PROTHROMBIN TIME + INR    Collection Time: 05/20/19  8:10 AM   Result Value Ref Range    Prothrombin time 13.1 11.5 - 15.2 sec    INR 1.0 0.8 - 1.2     HGB & HCT    Collection Time: 05/20/19  8:47 AM   Result Value Ref Range    HGB 9.8 (L) 12.0 - 16.0 g/dL    HCT 32.3 (L) 35.0 - 45.0 %   EKG, 12 LEAD, SUBSEQUENT    Collection Time: 05/20/19 10:00 AM   Result Value Ref Range    Ventricular Rate 70 BPM    Atrial Rate 70 BPM    P-R Interval 168 ms    QRS Duration 88 ms    Q-T Interval 414 ms    QTC Calculation (Bezet) 447 ms    Calculated P Axis 54 degrees    Calculated T Axis 67 degrees    Diagnosis       Normal sinus rhythm  Nonspecific ST abnormality  Abnormal ECG  When compared with ECG of 19-MAY-2019 18:04,  ST elevation has replaced ST depression in Inferior leads  Non-specific change in ST segment in Anterolateral leads  T wave inversion no longer evident in Inferior leads  Nonspecific T wave abnormality, improved in Lateral leads  Confirmed by Chanda Guan MD, -- ([id number]) on 5/20/2019 6:02:04 PM     ECHO ADULT COMPLETE    Collection Time: 05/20/19 10:52 AM   Result Value Ref Range    LA Volume 48.99 22 - 52 mL    Right Atrial Area 4C 14.53 cm2 Ao Root D 2.88 cm    AO ASC D 3.69 cm    AO ARCH D 2.48 cm    Aortic Valve Systolic Peak Velocity 638.06 cm/s    AoV VTI 26.63 cm    Aortic Valve Area by Continuity of Peak Velocity 2.7 cm2    Aortic Valve Area by Continuity of VTI 2.9 cm2    AoV PG 5.9 mmHg    LVIDd 3.98 3.9 - 5.3 cm    LVPWd 1.25 (A) 0.6 - 0.9 cm    LVIDs 2.49 cm    IVSd 1.83 (A) 0.6 - 0.9 cm    LV ED Vol A2C 67.7 mL    LV ES Vol A4C 12.2 mL    LV ES Vol BP 25.5 19 - 49 mL    LVOT d 2.01 cm    LVOT Peak Velocity 104.55 cm/s    LVOT Peak Gradient 4.4 mmHg    LVOT VTI 24.38 cm    LV E' Septal Velocity 5.00 cm/s    LV E' Lateral Velocity 6.00 cm/s    MVA (PHT) 4.2 cm2    MV A Jayden 86.92 cm/s    MV E Jayden 55.10 cm/s    MV E/A 0.60     RVIDd 3.71 cm    Aortic Valve Systolic Mean Gradient 3.8 mmHg    BP EF 57.1 55 - 100 %    LV Ejection Fraction MOD 4C 77 %    LV Ejection Fraction MOD 2C 73 %    LA Vol 4C 37.37 22 - 52 mL    LA Vol 2C 49.44 22 - 52 mL    LV Mass .0 (A) 67 - 162 g    LV Mass AL Index 127.7 (A) 43 - 95 g/m2    E/E' lateral 9.18     E/E' septal 11.02     TAPSV 2.1 cm/s    IVC proximal 1.37 cm    E/E' ratio (averaged) 10.10     LV ES Vol A2C 18.6 mL    LVES Vol Index BP 13.9 mL/m2    LV ED Vol A4C 52.1 mL    LVED Vol Index BP 32.2 mL/m2    Mitral Valve E Wave Deceleration Time 178.5 ms    Mitral Valve Pressure Half-time 51.8 ms    Left Atrium Major Axis 2.93 cm    Pulmonic Valve Max Velocity 144.27 cm/s    LV ED Vol BP 59.3 56 - 104 ml    LA Vol Index 26.64 16 - 28 ml/m2    LA Vol Index 26.88 16 - 28 ml/m2    LA Vol Index 20.32 16 - 28 ml/m2    LVED Vol Index A4C 28.3 mL/m2    LVED Vol Index A2C 36.8 mL/m2    LVES Vol Index A4C 6.6 mL/m2    LVES Vol Index A2C 10.1 mL/m2    МАРИНА/BSA Pk Jayden 1.4 cm2/m2    МАРИНА/BSA VTI 1.5 cm2/m2    PV peak gradient 8.3 mmHg       Telemetry: normal sinus rhythm      Assessment:     Principal Problem:    Acute chest pain (5/19/2019)    Active Problems:    HTN (hypertension) (9/13/2013)      Elevated cholesterol (9/13/2013)      Diabetes ([facility] [address] (5/19/2019)      NSTEMI (non-ST elevated myocardial infarction) ([facility] [address] (5/19/2019)      Overview: Added automatically from request for surgery [id number]        Plan:     Stable post PCI with TL to LAD. Has RCA disease which we plan to do intervention on Wednesday AM.because of contrast load today.     Apurva Isaacs MD

## 2019-05-21 NOTE — PROGRESS NOTES
0700: Assumed care of pt from [facility]. Pt alert no c/o pain. EKG complete by Marsha. 0900: Pt had discussion with Ramón from the cath lab regarding what was done in the cath lab. Pt stated Dr. David Meier hurt her in the cath lab during the procedure. Pt stated that she could feel the catheter moving in her arm. Pt also stated that there was no communication on what was going to happen. Pt stated that she was glad Rhianna Marvin came to talk to her. 1100: Patient advocate told this RN that she did not want Chritsian to do her procedure. Naomie Ames RN the director the  will speak with her.   1600: Naomie Ames RN spoke with pt and Dr. David Meier regarding procedure tomorrow pt will go ahead as planned. 1800: Pt had uneventful shift.

## 2019-05-21 NOTE — ROUTINE PROCESS
Assisting primary nurse with care of pt. Written orders at 21  for CBC 3 hours post procedure placed in chart by Cardiologist. Orders placed for a CBC now. Will update primary nurse.

## 2019-05-21 NOTE — PROGRESS NOTES
Cardiology Progress Note      5/21/2019 9:11 AM    Admit Date: 5/19/2019    Admit Diagnosis: Acute chest pain [R07.9]  Diabetes ([address] [E11.9]      Subjective:     Jimena Zimmerman denies chest pain. Visit Vitals  BP (!) 134/94   Pulse 82   Temp 98.5 °F (36.9 °C)   Resp 16   Ht 5' 4\" (1.626 m)   Wt 77.2 kg (170 lb 3.2 oz)   SpO2 100%   Breastfeeding?  No   BMI 29.21 kg/m²     Current Facility-Administered Medications   Medication Dose Route Frequency    pantoprazole (PROTONIX) 40 mg in sodium chloride 0.9% 10 mL injection  40 mg IntraVENous QHS    sodium chloride (NS) flush 5-40 mL  5-40 mL IntraVENous Q8H    sodium chloride (NS) flush 5-40 mL  5-40 mL IntraVENous PRN    aspirin chewable tablet 81 mg  81 mg Oral DAILY    atropine injection 0.5 mg  0.5 mg IntraVENous PRN    nitroglycerin (NITROSTAT) tablet 0.4 mg  0.4 mg SubLINGual Q5MIN PRN    ticagrelor (BRILINTA) tablet 90 mg  90 mg Oral BID    atorvastatin (LIPITOR) tablet 40 mg  40 mg Oral QHS    dilTIAZem CD (CARDIZEM CD) capsule 240 mg  240 mg Oral DAILY    losartan (COZAAR) tablet 25 mg  25 mg Oral DAILY    oxyCODONE-acetaminophen (PERCOCET) 5-325 mg per tablet 2 Tab  2 Tab Oral Q6H PRN    ondansetron (ZOFRAN) injection 4 mg  4 mg IntraVENous Q4H PRN    insulin lispro (HUMALOG) injection   SubCUTAneous AC&HS    heparin (porcine) injection 5,000 Units  5,000 Units SubCUTAneous Q8H    cloNIDine HCl (CATAPRES) tablet 0.2 mg  0.2 mg Oral BID    ferrous sulfate tablet 325 mg  1 Tab Oral ACB    gabapentin (NEURONTIN) capsule 300 mg  300 mg Oral BID    potassium chloride (K-DUR, KLOR-CON) SR tablet 20 mEq  20 mEq Oral EVERY OTHER DAY    valACYclovir (VALTREX) tablet 500 mg  500 mg Oral DAILY    glucose chewable tablet 16 g  16 g Oral PRN    glucagon (GLUCAGEN) injection 1 mg  1 mg IntraMUSCular PRN    dextrose (D50W) injection syrg 12.5-25 g  25-50 mL IntraVENous PRN    fish oil-omega-3 fatty acids 340-1,000 mg capsule 1 Cap  1 Cap Oral DAILY  raNITIdine (ZANTAC) tablet 150 mg  150 mg Oral BID    nitroglycerin (NITROSTAT) tablet 0.4 mg  0.4 mg SubLINGual PRN         Objective:      Physical Exam:  Visit Vitals  BP (!) 134/94   Pulse 82   Temp 98.5 °F (36.9 °C)   Resp 16   Ht 5' 4\" (1.626 m)   Wt 77.2 kg (170 lb 3.2 oz)   SpO2 100%   Breastfeeding? No   BMI 29.21 kg/m²     General Appearance:  Well developed, well nourished,alert and oriented x 3, and individual in no acute distress. Ears/Nose/Mouth/Throat:   Hearing grossly normal.         Neck: Supple. Chest:   Lungs clear to auscultation bilaterally. Cardiovascular:  Regular rate and rhythm, S1, S2 normal, no murmur. Abdomen:   Soft, non-tender, bowel sounds are active. Extremities: No edema bilaterally. Skin: Warm and dry.                Data Review:   Labs:    Recent Results (from the past 24 hour(s))   EKG, 12 LEAD, SUBSEQUENT    Collection Time: 05/20/19 10:00 AM   Result Value Ref Range    Ventricular Rate 70 BPM    Atrial Rate 70 BPM    P-R Interval 168 ms    QRS Duration 88 ms    Q-T Interval 414 ms    QTC Calculation (Bezet) 447 ms    Calculated P Axis 54 degrees    Calculated T Axis 67 degrees    Diagnosis       Normal sinus rhythm  Nonspecific ST abnormality  Abnormal ECG  When compared with ECG of 19-MAY-2019 18:04,  ST elevation has replaced ST depression in Inferior leads  Non-specific change in ST segment in Anterolateral leads  T wave inversion no longer evident in Inferior leads  Nonspecific T wave abnormality, improved in Lateral leads  Confirmed by Ty Anton MD, -- ([id number]) on 5/20/2019 6:02:04 PM     ECHO ADULT COMPLETE    Collection Time: 05/20/19 10:52 AM   Result Value Ref Range    LA Volume 48.99 22 - 52 mL    Right Atrial Area 4C 14.53 cm2    Ao Root D 2.88 cm    AO ASC D 3.69 cm    AO ARCH D 2.48 cm    Aortic Valve Systolic Peak Velocity 419.25 cm/s    AoV VTI 26.63 cm    Aortic Valve Area by Continuity of Peak Velocity 2.7 cm2    Aortic Valve Area by Continuity of VTI 2.9 cm2    AoV PG 5.9 mmHg    LVIDd 3.98 3.9 - 5.3 cm    LVPWd 1.25 (A) 0.6 - 0.9 cm    LVIDs 2.49 cm    IVSd 1.83 (A) 0.6 - 0.9 cm    LV ED Vol A2C 67.7 mL    LV ES Vol A4C 12.2 mL    LV ES Vol BP 25.5 19 - 49 mL    LVOT d 2.01 cm    LVOT Peak Velocity 104.55 cm/s    LVOT Peak Gradient 4.4 mmHg    LVOT VTI 24.38 cm    LV E' Septal Velocity 5.00 cm/s    LV E' Lateral Velocity 6.00 cm/s    MVA (PHT) 4.2 cm2    MV A Jayden 86.92 cm/s    MV E Jayden 55.10 cm/s    MV E/A 0.60     RVIDd 3.71 cm    Aortic Valve Systolic Mean Gradient 3.8 mmHg    BP EF 57.1 55 - 100 %    LV Ejection Fraction MOD 4C 77 %    LV Ejection Fraction MOD 2C 73 %    LA Vol 4C 37.37 22 - 52 mL    LA Vol 2C 49.44 22 - 52 mL    LV Mass .0 (A) 67 - 162 g    LV Mass AL Index 127.7 (A) 43 - 95 g/m2    E/E' lateral 9.18     E/E' septal 11.02     TAPSV 2.1 cm/s    IVC proximal 1.37 cm    E/E' ratio (averaged) 10.10     LV ES Vol A2C 18.6 mL    LVES Vol Index BP 13.9 mL/m2    LV ED Vol A4C 52.1 mL    LVED Vol Index BP 32.2 mL/m2    Mitral Valve E Wave Deceleration Time 178.5 ms    Mitral Valve Pressure Half-time 51.8 ms    Left Atrium Major Axis 2.93 cm    Pulmonic Valve Max Velocity 144.27 cm/s    LV ED Vol BP 59.3 56 - 104 ml    LA Vol Index 26.64 16 - 28 ml/m2    LA Vol Index 26.88 16 - 28 ml/m2    LA Vol Index 20.32 16 - 28 ml/m2    LVED Vol Index A4C 28.3 mL/m2    LVED Vol Index A2C 36.8 mL/m2    LVES Vol Index A4C 6.6 mL/m2    LVES Vol Index A2C 10.1 mL/m2    МАРИНА/BSA Pk Jayden 1.4 cm2/m2    МАРИНА/BSA VTI 1.5 cm2/m2    PV peak gradient 8.3 mmHg   GLUCOSE, POC    Collection Time: 05/20/19  8:57 PM   Result Value Ref Range    Glucose (POC) 113 (H) 70 - 110 mg/dL   CBC W/O DIFF    Collection Time: 05/20/19  9:50 PM   Result Value Ref Range    WBC 16.9 (H) 4.6 - 13.2 K/uL    RBC 4.89 4.20 - 5.30 M/uL    HGB 11.0 (L) 12.0 - 16.0 g/dL    HCT 34.9 (L) 35.0 - 45.0 %    MCV 71.4 (L) 74.0 - 97.0 FL    MCH 22.5 (L) 24.0 - 34.0 PG    MCHC 31.5 31.0 - 37.0 g/dL RDW 16.7 (H) 11.6 - 14.5 %    PLATELET 037 (H) 595 - 420 K/uL    MPV 8.4 (L) 9.2 - 01.0 FL   METABOLIC PANEL, BASIC    Collection Time: 05/21/19  4:23 AM   Result Value Ref Range    Sodium 143 136 - 145 mmol/L    Potassium 3.5 3.5 - 5.5 mmol/L    Chloride 107 100 - 108 mmol/L    CO2 27 21 - 32 mmol/L    Anion gap 9 3.0 - 18 mmol/L    Glucose 94 74 - 99 mg/dL    BUN 6 (L) 7.0 - 18 MG/DL    Creatinine 0.76 0.6 - 1.3 MG/DL    BUN/Creatinine ratio 8 (L) 12 - 20      GFR est AA >60 >60 ml/min/1.73m2    GFR est non-AA >60 >60 ml/min/1.73m2    Calcium 9.1 8.5 - 10.1 MG/DL   LIPID PANEL    Collection Time: 05/21/19  4:23 AM   Result Value Ref Range    LIPID PROFILE          Cholesterol, total 199 <200 MG/DL    Triglyceride 106 <150 MG/DL    HDL Cholesterol 35 (L) 40 - 60 MG/DL    LDL, calculated 142.8 (H) 0 - 100 MG/DL    VLDL, calculated 21.2 MG/DL    CHOL/HDL Ratio 5.7 (H) 0 - 5.0     GLUCOSE, POC    Collection Time: 05/21/19  6:34 AM   Result Value Ref Range    Glucose (POC) 101 70 - 110 mg/dL   EKG, 12 LEAD, SUBSEQUENT    Collection Time: 05/21/19  7:24 AM   Result Value Ref Range    Ventricular Rate 73 BPM    Atrial Rate 73 BPM    P-R Interval 180 ms    QRS Duration 86 ms    Q-T Interval 458 ms    QTC Calculation (Bezet) 504 ms    Calculated P Axis 64 degrees    Calculated R Axis 17 degrees    Calculated T Axis -122 degrees    Diagnosis       Normal sinus rhythm  ST & T wave abnormality, consider anterolateral ischemia  Prolonged QT  Abnormal ECG  When compared with ECG of 20-MAY-2019 10:00,  ST no longer elevated in Inferior leads  Non-specific change in ST segment in Anterolateral leads  Nonspecific T wave abnormality now evident in Inferior leads  T wave inversion now evident in Anterolateral leads  QT has lengthened  Confirmed by Magaly Salomon MD, -- ([id number]) on 5/21/2019 8:20:10 AM         Telemetry: normal sinus rhythm      Assessment:     Principal Problem:    Acute chest pain (5/19/2019)    Active Problems: HTN (hypertension) (9/13/2013)      Elevated cholesterol (9/13/2013)      Diabetes ([facility] (5/19/2019)      NSTEMI (non-ST elevated myocardial infarction) ([facility] (5/19/2019)      Overview: Added automatically from request for surgery [id number]        Plan:     Chest pain free. Plan PCI of RCA tomorrow morining.     Maddie Méndez MD

## 2019-05-21 NOTE — ROUTINE PROCESS
Bedside shift change report given to [id number] (oncoming nurse) by Junior Jacinto RN (offgoing nurse). Report included the following information SBAR, Kardex, Intake/Output and MAR.

## 2019-05-21 NOTE — PROGRESS NOTES
Chart reviewed cm will follow for d/c planning, pt would benefit from [facility] when discharged, cm will attempt to visit pt while on unit.

## 2019-05-22 VITALS
DIASTOLIC BLOOD PRESSURE: 76 MMHG | BODY MASS INDEX: 29.06 KG/M2 | RESPIRATION RATE: 18 BRPM | TEMPERATURE: 97.9 F | SYSTOLIC BLOOD PRESSURE: 124 MMHG | WEIGHT: 170.2 LBS | HEART RATE: 73 BPM | OXYGEN SATURATION: 100 % | HEIGHT: 64 IN

## 2019-05-22 LAB
ECHO AO ARCH DIAM: 2.48 CM
ECHO AO ASC DIAM: 3.69 CM
ECHO AO ROOT DIAM: 2.88 CM
ECHO AV AREA PEAK VELOCITY: 2.7 CM2
ECHO AV AREA VTI: 2.9 CM2
ECHO AV AREA/BSA PEAK VELOCITY: 1.4 CM2/M2
ECHO AV AREA/BSA VTI: 1.5 CM2/M2
ECHO AV MEAN GRADIENT: 3.8 MMHG
ECHO AV PEAK GRADIENT: 5.9 MMHG
ECHO AV PEAK VELOCITY: 121.05 CM/S
ECHO AV VTI: 26.63 CM
ECHO IVC PROX: 1.37 CM
ECHO LA MAJOR AXIS: 2.93 CM
ECHO LA VOL 2C: 49.44 ML (ref 22–52)
ECHO LA VOL 4C: 37.37 ML (ref 22–52)
ECHO LA VOL BP: 48.99 ML (ref 22–52)
ECHO LA VOL/BSA BIPLANE: 26.64 ML/M2 (ref 16–28)
ECHO LA VOLUME INDEX A2C: 26.88 ML/M2 (ref 16–28)
ECHO LA VOLUME INDEX A4C: 20.32 ML/M2 (ref 16–28)
ECHO LV E' LATERAL VELOCITY: 6 CM/S
ECHO LV E' SEPTAL VELOCITY: 5 CM/S
ECHO LV EDV A2C: 67.7 ML
ECHO LV EDV A4C: 52.1 ML
ECHO LV EDV BP: 59.3 ML (ref 56–104)
ECHO LV EDV INDEX A4C: 28.3 ML/M2
ECHO LV EDV INDEX BP: 32.2 ML/M2
ECHO LV EDV NDEX A2C: 36.8 ML/M2
ECHO LV EJECTION FRACTION A2C: 73 %
ECHO LV EJECTION FRACTION A4C: 77 %
ECHO LV EJECTION FRACTION BIPLANE: 57.1 % (ref 55–100)
ECHO LV ESV A2C: 18.6 ML
ECHO LV ESV A4C: 12.2 ML
ECHO LV ESV BP: 25.5 ML (ref 19–49)
ECHO LV ESV INDEX A2C: 10.1 ML/M2
ECHO LV ESV INDEX A4C: 6.6 ML/M2
ECHO LV ESV INDEX BP: 13.9 ML/M2
ECHO LV INTERNAL DIMENSION DIASTOLIC: 3.98 CM (ref 3.9–5.3)
ECHO LV INTERNAL DIMENSION SYSTOLIC: 2.49 CM
ECHO LV IVSD: 1.83 CM (ref 0.6–0.9)
ECHO LV MASS 2D: 286 G (ref 67–162)
ECHO LV MASS INDEX 2D: 155.5 G/M2 (ref 43–95)
ECHO LV POSTERIOR WALL DIASTOLIC: 1.25 CM (ref 0.6–0.9)
ECHO LVOT DIAM: 2.01 CM
ECHO LVOT PEAK GRADIENT: 4.4 MMHG
ECHO LVOT PEAK VELOCITY: 104.55 CM/S
ECHO LVOT VTI: 24.38 CM
ECHO MV A VELOCITY: 86.92 CM/S
ECHO MV AREA PHT: 4.2 CM2
ECHO MV E DECELERATION TIME (DT): 178.5 MS
ECHO MV E VELOCITY: 55.1 CM/S
ECHO MV E/A RATIO: 0.63
ECHO MV E/E' LATERAL: 9.18
ECHO MV E/E' RATIO (AVERAGED): 10.1
ECHO MV E/E' SEPTAL: 11.02
ECHO MV PRESSURE HALF TIME (PHT): 51.8 MS
ECHO PV MAX VELOCITY: 144.27 CM/S
ECHO PV PEAK GRADIENT: 8.3 MMHG
ECHO RA AREA 4C: 14.53 CM2
ECHO RV INTERNAL DIMENSION: 3.71 CM
ECHO TRICUSPID ANNULAR PEAK SYSTOLIC VELOCITY: 2.1 CM/S
GLUCOSE BLD STRIP.AUTO-MCNC: 112 MG/DL (ref 70–110)
GLUCOSE BLD STRIP.AUTO-MCNC: 112 MG/DL (ref 70–110)
MAGNESIUM SERPL-MCNC: 2.4 MG/DL (ref 1.6–2.6)

## 2019-05-22 PROCEDURE — 83735 ASSAY OF MAGNESIUM: CPT

## 2019-05-22 PROCEDURE — 74011250637 HC RX REV CODE- 250/637: Performed by: INTERNAL MEDICINE

## 2019-05-22 PROCEDURE — 82962 GLUCOSE BLOOD TEST: CPT

## 2019-05-22 PROCEDURE — 74011250637 HC RX REV CODE- 250/637: Performed by: FAMILY MEDICINE

## 2019-05-22 PROCEDURE — 36415 COLL VENOUS BLD VENIPUNCTURE: CPT

## 2019-05-22 RX ORDER — SODIUM CHLORIDE 0.9 % (FLUSH) 0.9 %
5-40 SYRINGE (ML) INJECTION EVERY 8 HOURS
Status: DISCONTINUED | OUTPATIENT
Start: 2019-05-22 | End: 2019-05-22 | Stop reason: HOSPADM

## 2019-05-22 RX ORDER — SODIUM CHLORIDE 0.9 % (FLUSH) 0.9 %
5-40 SYRINGE (ML) INJECTION AS NEEDED
Status: DISCONTINUED | OUTPATIENT
Start: 2019-05-22 | End: 2019-05-22 | Stop reason: HOSPADM

## 2019-05-22 RX ORDER — LOSARTAN POTASSIUM 25 MG/1
25 TABLET ORAL DAILY
Qty: 30 TAB | Refills: 0 | Status: SHIPPED | OUTPATIENT
Start: 2019-05-23

## 2019-05-22 RX ORDER — ATORVASTATIN CALCIUM 40 MG/1
40 TABLET, FILM COATED ORAL
Qty: 30 TAB | Refills: 0 | Status: SHIPPED | OUTPATIENT
Start: 2019-05-22 | End: 2021-02-01

## 2019-05-22 RX ORDER — METFORMIN HYDROCHLORIDE 500 MG/1
500 TABLET ORAL 2 TIMES DAILY WITH MEALS
Qty: 30 TAB | Refills: 0 | Status: SHIPPED
Start: 2019-05-22 | End: 2021-02-01

## 2019-05-22 RX ORDER — ASPIRIN 81 MG/1
81 TABLET ORAL DAILY
Qty: 30 TAB | Refills: 0 | Status: SHIPPED
Start: 2019-05-22

## 2019-05-22 RX ADMIN — ASPIRIN 81 MG 81 MG: 81 TABLET ORAL at 08:20

## 2019-05-22 RX ADMIN — CLONIDINE HYDROCHLORIDE 0.2 MG: 0.1 TABLET ORAL at 08:20

## 2019-05-22 RX ADMIN — Medication 10 ML: at 06:00

## 2019-05-22 RX ADMIN — DILTIAZEM HYDROCHLORIDE 240 MG: 240 CAPSULE, COATED, EXTENDED RELEASE ORAL at 08:19

## 2019-05-22 RX ADMIN — TICAGRELOR 90 MG: 90 TABLET ORAL at 08:19

## 2019-05-22 RX ADMIN — GABAPENTIN 300 MG: 300 CAPSULE ORAL at 08:19

## 2019-05-22 RX ADMIN — LOSARTAN POTASSIUM 25 MG: 25 TABLET ORAL at 08:24

## 2019-05-22 RX ADMIN — VALACYCLOVIR HYDROCHLORIDE 500 MG: 500 TABLET, FILM COATED ORAL at 08:20

## 2019-05-22 RX ADMIN — Medication 1 CAPSULE: at 08:18

## 2019-05-22 NOTE — DISCHARGE SUMMARY
Discharge Summary    Patient: Francine Salmeron MRN: [id number]  CSN: [id number]    YOB: 1955  Age: 59 y.o. Sex: female    DOA: 5/19/2019 LOS:  LOS: 3 days   Discharge Date:      Primary Care Provider:  Margarita Jasso MD    Admission Diagnoses: Acute chest pain [R07.9]  Diabetes [facility]) [E11.9]    Discharge Diagnoses:    Problem List as of 5/22/2019 Date Reviewed: 5/20/2019          Codes Class Noted - Resolved    Diabetes ([facility] ICD-10-CM: E11.9  ICD-9-CM: 250.00  5/19/2019 - Present        * (Principal) Acute chest pain ICD-10-CM: R07.9  ICD-9-CM: 786.50  5/19/2019 - Present        NSTEMI (non-ST elevated myocardial infarction) ([facility] ICD-10-CM: I21.4  ICD-9-CM: 410.70  5/19/2019 - Present    Overview Signed 5/20/2019  8:15 AM by Oralee Patience     Added automatically from request for surgery [id number]             Rotator cuff impingement syndrome of left shoulder (Chronic) ICD-10-CM: M75.42  ICD-9-CM: 726.10  1/7/2019 - Present        DM (diabetes mellitus) ([facility] (Chronic) ICD-10-CM: E11.9  ICD-9-CM: 250.00  9/13/2013 - Present        HTN (hypertension) (Chronic) ICD-10-CM: I10  ICD-9-CM: 401.9  9/13/2013 - Present        Elevated cholesterol (Chronic) ICD-10-CM: E78.00  ICD-9-CM: 272.0  9/13/2013 - Present        History of angina (Chronic) ICD-10-CM: Z86.79  ICD-9-CM: V12.59  9/13/2013 - Present        Cervical spondylosis ICD-10-CM: E07.849  ICD-9-CM: 721.0  9/13/2013 - Present        Cervical spinal stenosis ICD-10-CM: M48.02  ICD-9-CM: 723.0  9/13/2013 - Present              Discharge Medications:     Current Discharge Medication List      START taking these medications    Details   atorvastatin (LIPITOR) 40 mg tablet Take 1 Tab by mouth nightly. Qty: 30 Tab, Refills: 0      losartan (COZAAR) 25 mg tablet Take 1 Tab by mouth daily. Qty: 30 Tab, Refills: 0      ticagrelor (BRILINTA) 90 mg tablet Take 1 Tab by mouth two (2) times a day.   Qty: 60 Tab, Refills: 11         CONTINUE these medications which have CHANGED    Details   metFORMIN (GLUCOPHAGE) 500 mg tablet Take 1 Tab by mouth two (2) times daily (with meals). Indications: type 2 diabetes mellitus  Qty: 30 Tab, Refills: 0      aspirin delayed-release 81 mg tablet Take 1 Tab by mouth daily. Qty: 30 Tab, Refills: 0         CONTINUE these medications which have NOT CHANGED    Details   ondansetron (ZOFRAN ODT) 4 mg disintegrating tablet Take 1 Tab by mouth every eight (8) hours as needed for Nausea. Qty: 20 Tab, Refills: 0      cloNIDine HCl (CATAPRES) 0.2 mg tablet Take 0.2 mg by mouth two (2) times a day. Omega-3 Fatty Acids (FISH OIL) 500 mg cap Take  by mouth daily. ferrous sulfate (IRON) 325 mg (65 mg iron) tablet Take  by mouth Daily (before breakfast). valACYclovir (VALTREX) 1 gram tablet Take  by mouth daily. diltiazem LA (CARDIZEM LA) 420 mg ER tablet Take 420 mg by mouth daily. gabapentin (NEURONTIN) 100 mg capsule Take 300 mg by mouth two (2) times a day. potassium chloride (K-DUR, KLOR-CON) 20 mEq tablet Take 20 mEq by mouth every other day. STOP taking these medications       HYDROmorphone (DILAUDID) 2 mg tablet Comments:   Reason for Stopping:         promethazine (PHENERGAN) 25 mg tablet Comments:   Reason for Stopping:         lidocaine-kinesiology tape 5 % kit Comments:   Reason for Stopping:         traMADol (ULTRAM) 50 mg tablet Comments:   Reason for Stopping:               Discharge Condition: Stable    Procedures : Cardiac cath with PCI    Consults: Cardiology      PHYSICAL EXAM    Visit Vitals  /86   Pulse 73   Temp 98.4 °F (36.9 °C)   Resp 18   Ht 5' 4\" (1.626 m)   Wt 77.2 kg (170 lb 3.2 oz)   SpO2 100%   Breastfeeding? No   BMI 29.21 kg/m²     General: Elderly appearing AA female. Awake, cooperative, no acute distress    HEENT: NC, Atraumatic. PERRLA, EOMI. Anicteric sclerae. Lungs:  CTA Bilaterally. No Wheezing/Rhonchi/Rales.   Heart:  Regular  rhythm,  No murmur, No Rubs, No Gallops  Abdomen: Soft, Non distended, Non tender. +Bowel sounds,   Extremities: No c/c/e  Psych:   Not anxious or agitated. Neurologic:  No acute neurological deficits. Admission HPI : Anat Logan is a 59 y.o. female who has a history of diabetes and HTN presenting with chest pain radiating to her back early this morning. She noticed it on the way back from the bathroom and it has not abated since it began. Her niece called EMS and ASA was given en route. GI cocktail improved her discomfort somewhat. Was seen in the ER last week for dizziness but returned to baseline and was feeling well when she went to bed last night. Hospital Course : This patient was admitted to medical services on May 19, 2019 for chest pain. Other admission diagnoses include hypercholesterolemia, uncontrolled non-insulin dependent type II diabetes, GERD, and hypertension. The patient was admitted to the telemetry unit for further care. Her troponin on admission was initially 0.02, then elevated to 0. 11. Cardiology was consulted, and the patient was seen by Dr Sharren Kayser. The patient continued to have chest pain, and it was opted to take the patient for cardiac cath. The patient underwent cardiac cath which revealed stenosis of the LAD requiring a stent. There was also stenosis of the RCA, which Dr Sharren Kayser stated needed stenting, but the patient refused further intervention by the cardiology staff at [facility]. She was offered another cardiologist, but refused. She stated she wanted to be seen by her usual cardiologist, Dr Dolly Simmons, and have the procedure performed by him. The case was discussed with Dr Sharren Kayser, who stated that the stent to the RCA was not emergent, and the patient could be discharged home with outpatient cardiology follow up. I have discussed the patient's transition of care with Dr Delgado's office and there is an opening for an appointment tomorrow.  The patient is agreeable to this and her appointment has been changed. At the time of discharge, the patient is chest pain free. She will be discharged on Brilinta. She was started on a sliding scale insulin regimen during her stay and her blood glucose levels have remained stable. She was advised to resume her usual Metformin at the time of discharge. The patient's blood pressure remained stable as well, and was monitored with routine vitals, and she was advised to resume her usual antihypertensive medication regimen at the time of discharge. Activity: Activity as tolerated    Diet: Cardiac Diet    Follow-up: PCP; Cardiology    Disposition: Home    Minutes spent on discharge: 42       Labs: Results:       Chemistry Recent Labs     05/21/19  0423 05/20/19  0810   GLU 94 119*    143   K 3.5 3.5    109*   CO2 27 27   BUN 6* 10   CREA 0.76 0.74   CA 9.1 9.4   AGAP 9 7   BUCR 8* 14   AP  --  118*   TP  --  6.6   ALB  --  3.3*   GLOB  --  3.3   AGRAT  --  1.0      CBC w/Diff Recent Labs     05/20/19  2150 05/20/19  0847 05/20/19  0810   WBC 16.9*  --  11.0   RBC 4.89  --  4.57   HGB 11.0* 9.8* 10.0*   HCT 34.9* 32.3* 33.0*   *  --  429*   GRANS  --   --  53   LYMPH  --   --  37   EOS  --   --  3      Cardiac Enzymes Recent Labs     05/19/19  1934 05/19/19  1351   CPK 68 66   CKND1 CALCULATION NOT PERFORMED WHEN RESULT IS BELOW LINEAR LIMIT CALCULATION NOT PERFORMED WHEN RESULT IS BELOW LINEAR LIMIT      Coagulation Recent Labs     05/20/19  0810   PTP 13.1   INR 1.0       Lipid Panel Lab Results   Component Value Date/Time    Cholesterol, total 199 05/21/2019 04:23 AM    HDL Cholesterol 35 (L) 05/21/2019 04:23 AM    LDL, calculated 142.8 (H) 05/21/2019 04:23 AM    VLDL, calculated 21.2 05/21/2019 04:23 AM    Triglyceride 106 05/21/2019 04:23 AM    CHOL/HDL Ratio 5.7 (H) 05/21/2019 04:23 AM      BNP No results for input(s): BNPP in the last 72 hours.    Liver Enzymes Recent Labs     05/20/19  0810   TP 6.6   ALB 3.3*   *   SGOT 10*      Thyroid Studies No results found for: T4, T3U, TSH, TSHEXT         Significant Diagnostic Studies: Xr Chest Port    Result Date: 5/19/2019  ============================ [facility] ============================ EXAM:AP chest xray INDICATION: Chest pain COMPARISON: 9/19/2013 chest x-ray FINDINGS: The cardiomediastinal silhouette is stable. Aorta is ectatic. The heart is not enlarged. The lungs are clear. The pulmonary vasculature is not engorged. The costophrenic angles are well defined. No pneumothorax is detected. The cortical margins are intact fusion hardware at the cervical spine is partially visualized. The trachea is midline. IMPRESSION: No acute cardiopulmonary process. No results found for this or any previous visit.         CC: Luther Bledsoe MD

## 2019-05-22 NOTE — PROGRESS NOTES
Transtion of care: anticiipate d/c home today  Cm was informed that patient has declined to have cath done. She wants to go to [facility]. Cm spoke with patient and she wants to see Dr. Eleazar Barber. She asked cm if he could get her an appointment with Dr. Eleazar Barber. Cms aware. Patient states she does have an appointment with her PCP on  06/0/2019 she does not know what time but she has it written down at home. Patient states she lives with her niece. Denies needs from niece. Plan for d/c today. No other needs or DME at this time   1124 cm met with patient and informed her of appointment with Dr. Eleazar Barber on    Follow up with Khang Moran MD on 6/5/2019          Specialty: Cardiology      [address] [employer]   [phone]    Follow-up appointment @ 11:00 a.m.  This is physician's first available appointment. Care Management Interventions  PCP Verified by CM: Yes  Mode of Transport at Discharge:  Other (see comment)(family or Lyft)  Transition of Care Consult (CM Consult): Discharge Planning  Current Support Network: Own Home(lives with niece)  Confirm Follow Up Transport: Family  Plan discussed with Pt/Family/Caregiver: Yes  Discharge Location  Discharge Placement: Home with family assistance

## 2019-05-22 NOTE — PROGRESS NOTES
NUTRITION EDUCATION    Nutrition Education was provided for diagnosis of high cholesterol. Discussed foods that lower cholesterol. Pt today said that she follow the meals that her niece fixes-yesterday this was not the case. She said that she limits her fat consumption and that her niece only uses peanut and olive oil. Provided contact information and handouts outlining these recommendations. NUTRITION DIAGNOSIS  Knowledge related nutrition deifict related to high cholesterol as evidenced by pt request of diet edu    GOALS  Pt able to choose foods lower cholesterol     See education tab for further details.   Emelia Walsh RD  PAGER:  [phone]

## 2019-05-22 NOTE — PROGRESS NOTES
Cardiology Progress Note      5/22/2019 8:51 AM    Admit Date: 5/19/2019    Admit Diagnosis: Acute chest pain [R07.9]  Diabetes ([address] [E11.9]      Subjective:     Devorah Wilson denies chest pain. Visit Vitals  /86   Pulse 73   Temp 98.4 °F (36.9 °C)   Resp 18   Ht 5' 4\" (1.626 m)   Wt 77.2 kg (170 lb 3.2 oz)   SpO2 100%   Breastfeeding?  No   BMI 29.21 kg/m²     Current Facility-Administered Medications   Medication Dose Route Frequency    sodium chloride (NS) flush 5-40 mL  5-40 mL IntraVENous Q8H    sodium chloride (NS) flush 5-40 mL  5-40 mL IntraVENous PRN    pantoprazole (PROTONIX) 40 mg in sodium chloride 0.9% 10 mL injection  40 mg IntraVENous QHS    sodium chloride (NS) flush 5-40 mL  5-40 mL IntraVENous Q8H    sodium chloride (NS) flush 5-40 mL  5-40 mL IntraVENous PRN    aspirin chewable tablet 81 mg  81 mg Oral DAILY    atropine injection 0.5 mg  0.5 mg IntraVENous PRN    ticagrelor (BRILINTA) tablet 90 mg  90 mg Oral BID    atorvastatin (LIPITOR) tablet 40 mg  40 mg Oral QHS    dilTIAZem CD (CARDIZEM CD) capsule 240 mg  240 mg Oral DAILY    losartan (COZAAR) tablet 25 mg  25 mg Oral DAILY    oxyCODONE-acetaminophen (PERCOCET) 5-325 mg per tablet 2 Tab  2 Tab Oral Q6H PRN    ondansetron (ZOFRAN) injection 4 mg  4 mg IntraVENous Q4H PRN    insulin lispro (HUMALOG) injection   SubCUTAneous AC&HS    heparin (porcine) injection 5,000 Units  5,000 Units SubCUTAneous Q8H    cloNIDine HCl (CATAPRES) tablet 0.2 mg  0.2 mg Oral BID    ferrous sulfate tablet 325 mg  1 Tab Oral ACB    gabapentin (NEURONTIN) capsule 300 mg  300 mg Oral BID    potassium chloride (K-DUR, KLOR-CON) SR tablet 20 mEq  20 mEq Oral EVERY OTHER DAY    valACYclovir (VALTREX) tablet 500 mg  500 mg Oral DAILY    glucose chewable tablet 16 g  16 g Oral PRN    glucagon (GLUCAGEN) injection 1 mg  1 mg IntraMUSCular PRN    dextrose (D50W) injection syrg 12.5-25 g  25-50 mL IntraVENous PRN    fish oil-omega-3 fatty acids 340-1,000 mg capsule 1 Cap  1 Cap Oral DAILY    raNITIdine (ZANTAC) tablet 150 mg  150 mg Oral BID    nitroglycerin (NITROSTAT) tablet 0.4 mg  0.4 mg SubLINGual PRN         Objective:      Physical Exam:  Visit Vitals  /86   Pulse 73   Temp 98.4 °F (36.9 °C)   Resp 18   Ht 5' 4\" (1.626 m)   Wt 77.2 kg (170 lb 3.2 oz)   SpO2 100%   Breastfeeding? No   BMI 29.21 kg/m²     General Appearance:  Well developed, well nourished,alert and oriented x 3, and individual in no acute distress. Ears/Nose/Mouth/Throat:   Hearing grossly normal.         Neck: Supple. Chest:   Lungs clear to auscultation bilaterally. Cardiovascular:  Regular rate and rhythm, S1, S2 normal, no murmur. Abdomen:   Soft, non-tender, bowel sounds are active. Extremities: No edema bilaterally. Skin: Warm and dry. Data Review:   Labs:    Recent Results (from the past 24 hour(s))   GLUCOSE, POC    Collection Time: 05/21/19 11:57 AM   Result Value Ref Range    Glucose (POC) 99 70 - 110 mg/dL   GLUCOSE, POC    Collection Time: 05/21/19  5:01 PM   Result Value Ref Range    Glucose (POC) 133 (H) 70 - 110 mg/dL   GLUCOSE, POC    Collection Time: 05/21/19  9:09 PM   Result Value Ref Range    Glucose (POC) 126 (H) 70 - 110 mg/dL   GLUCOSE, POC    Collection Time: 05/22/19  6:07 AM   Result Value Ref Range    Glucose (POC) 112 (H) 70 - 110 mg/dL   MAGNESIUM    Collection Time: 05/22/19  6:41 AM   Result Value Ref Range    Magnesium 2.4 1.6 - 2.6 mg/dL       Telemetry: normal sinus rhythm      Assessment:     Principal Problem:    Acute chest pain (5/19/2019)    Active Problems:    HTN (hypertension) (9/13/2013)      Elevated cholesterol (9/13/2013)      Diabetes ([facility] (5/19/2019)      NSTEMI (non-ST elevated myocardial infarction) ([facility] (5/19/2019)      Overview: Added automatically from request for surgery [id number]        Plan:     Patient does not want me to do her cath/PCI. She requested transfer to [facility].   I told her that she needed PCI of RCA but that it is not emergent and she could be discharged home and see a [facility] cardiologist as an outpatient. I also offered to get another cardiologist (Dr. Ryley Cronin or Dr. Consuelo Dewey) to do her PCI here at [facility]. Will sign off. Please call me if needed.      Tyesha Henry MD

## 2019-05-22 NOTE — PROGRESS NOTES
Patient is nervous about procedure and requested to be transferred to [facility]. Unit Manager and Charge nurse notified. Manager spoke with patient and informed Dr. Re Eckert about patients concerns. Day nurse has also been  informed.

## 2019-05-22 NOTE — PROGRESS NOTES
Report received from TALIA Kate. Pt denies and chest pain. Patient npo since midnight for morning procedure. Bedside shift change report given to Susan Gipson (oncoming nurse) by Junior Rendon. Dory Zurita (offgoing nurse). Report included the following information SBAR and Kardex.

## 2019-05-22 NOTE — PROGRESS NOTES
0730: Bedside shift change report given to Christi Ortiz RN  (oncoming nurse) by Chris Montoya RN (offgoing nurse). Report included the following information Kardex, Intake/Output, Recent Results, Med Rec Status, Cardiac Rhythm sinus rhythm and Alarm Parameters . 0830: HECTOR Mattson at the bedside with pt, PA assess pt and discuss tx, pt makes PA aware she has no desire to have the cardiac catheterization completed due to cardiologist  Complaints, PA makes pt aware other treatment can be provided by cardiology staff, pt refuses, PA makes pt aware cardiologist will be in shortly to follow up with her, pt acknowledges and accepts, continue to monitor pt for any change  Christi Ortiz RN    0845: MD León Marin in to see pt and explain to pt that he does not have any other method to do pt surgery, pt refuses cardiac catheterization and request transfer, MD denies transfer and makes pt aware she will be discharged,  this nurse continues to monitor pt for any change  Christi Ortiz RN     1130: HECTOR Mattson in to see pt and explain the discharge order and further assessment, continue to monitor pt for any change  ALISA Ny RN     [phone]:  in to speak to pt. About discharge  Christi Ortiz RN     1250: discharge nurse in to see pt in reference to instructions, pt makes her aware she does not want to be discharged, discharge nurse makes PA Srinivasan aware  Christi Ortiz RN     1330: HECTOR Mattson in to speak with pt about discharge and further treatment, pt agrees to discharge  Christi Ortiz RN     [phone]: this nurse attempts to review discharge instructions with pt, pt request to read the entire packet alone, made pt aware she has the will to do so, continue to monitor pt for any change  Christi Ortiz RN     1500.  Pt refuse to sign discharge instructions due to the MyChart on the last page that gives pt directions on how to look online at hospital visit information and make appointments, this nurse, secondary nurse, and nurse manager explain the MyChart to pt, pt continues to refuse to sign discharge instructions and makes nursing staff her niece is on the way to pick her up  ALISA Ny RN     [phone]: niece at the bedside, wheelchair provided and pt discharge off the unit with all belongings, discharge instructions, and the medication the outpatient pharmacy has provided, no c/o health abnormalities prior to exiting the unit  Fanny Hudson Rn

## 2019-05-22 NOTE — DISCHARGE INSTRUCTIONS
Patient Education        Patient Education        Chest Pain: Care Instructions  Your Care Instructions    There are many things that can cause chest pain. Some are not serious and will get better on their own in a few days. But some kinds of chest pain need more testing and treatment. Your doctor may have recommended a follow-up visit in the next 8 to 12 hours. If you are not getting better, you may need more tests or treatment. Even though your doctor has released you, you still need to watch for any problems. The doctor carefully checked you, but sometimes problems can develop later. If you have new symptoms or if your symptoms do not get better, get medical care right away. If you have worse or different chest pain or pressure that lasts more than 5 minutes or you passed out (lost consciousness), call 911 or seek other emergency help right away. A medical visit is only one step in your treatment. Even if you feel better, you still need to do what your doctor recommends, such as going to all suggested follow-up appointments and taking medicines exactly as directed. This will help you recover and help prevent future problems. How can you care for yourself at home? · Rest until you feel better. · Take your medicine exactly as prescribed. Call your doctor if you think you are having a problem with your medicine. · Do not drive after taking a prescription pain medicine. When should you call for help? Call 911 if:    · You passed out (lost consciousness).     · You have severe difficulty breathing.     · You have symptoms of a heart attack. These may include:  ? Chest pain or pressure, or a strange feeling in your chest.  ? Sweating. ? Shortness of breath. ? Nausea or vomiting. ? Pain, pressure, or a strange feeling in your back, neck, jaw, or upper belly or in one or both shoulders or arms. ? Lightheadedness or sudden weakness. ? A fast or irregular heartbeat.   After you call 911, the  may tell you to chew 1 adult-strength or 2 to 4 low-dose aspirin. Wait for an ambulance. Do not try to drive yourself.    Call your doctor today if:    · You have any trouble breathing.     · Your chest pain gets worse.     · You are dizzy or lightheaded, or you feel like you may faint.     · You are not getting better as expected.     · You are having new or different chest pain. Where can you learn more? Go to http://raimundo-joselyn.info/. Enter A120 in the search box to learn more about \"Chest Pain: Care Instructions. \"  Current as of: September 23, 2018  Content Version: 11.9  © 3247-3166 [employer]. Care instructions adapted under license by [employer] (which disclaims liability or warranty for this information). If you have questions about a medical condition or this instruction, always ask your healthcare professional. [place] [address] any warranty or liability for your use of this information. Patient Education        Right Heart Catheterization: What to Expect at Home  Your Recovery    The right side of the heart receives blood from the body and pumps it to the lungs. The blood picks up oxygen in the lungs. A right heart catheterization (also called pulmonary artery catheterization) tests the blood pressure and oxygen levels in your lungs and heart. It also checks to see how well your heart is pumping. Your doctor put a thin, flexible tube (catheter) into a blood vessel in your neck, groin, or arm. During the test, the doctor moved the catheter through the blood vessel into your heart. A small balloon on the tip of the catheter helped guide it into the artery that carries blood to your lungs (pulmonary artery). If your doctor used an X-ray to see where to move the catheter, you also had dye injected into your blood vessel and heart. You may have swelling, bruising, or a small lump around the site where the catheter went into your body.  You can do light activities around the house. But wait several days before you do anything strenuous. This care sheet gives you a general idea about how long it will take for you to recover. But each person recovers at a different pace. Follow the steps below to get better as quickly as possible. How can you care for yourself at home? Activity    · If the doctor gave you a sedative:  ? For 24 hours, don't do anything that requires attention to detail. It takes time for the medicine's effects to completely wear off.  ? For your safety, do not drive or operate any machinery that could be dangerous. Wait until the medicine wears off and you can think clearly and react easily.     · Do not do strenuous exercise and do not lift, pull, or push anything heavy until your doctor says it is okay. This may be for several days. You can walk around the house and do light activity, such as cooking. Diet    · If you had dye injected, drink plenty of fluids to help your body flush out the dye. If you have kidney, heart, or liver disease and have to limit fluids, talk with your doctor before you increase the amount of fluids you drink.     · You can eat your normal diet. If your stomach is upset, try bland, low-fat foods like plain rice, broiled chicken, toast, and yogurt. Medicines    · Your doctor will tell you if and when you can restart your medicines. He or she will also give you instructions about taking any new medicines.     · If you take aspirin or some other blood thinner, be sure to talk to your doctor. He or she will tell you if and when to start taking this medicine again. Make sure that you understand exactly what your doctor wants you to do.     · Call your doctor if you think you are having a problem with your medicine. Care of the catheter site    · For 1 or 2 days, keep the bandage over the spot where the catheter was inserted.  The bandage probably will fall off in this time.     · Put ice or a cold pack on the area for 10 to 20 minutes at a time to help with soreness or swelling. Put a thin cloth between the ice and your skin.     · You may shower 24 to 48 hours after the procedure, if your doctor okays it. Pat the incision dry.     · Do not soak the catheter site until it is healed. Don't take a bath for 1 week, or until your doctor tells you it is okay. Follow-up care is a key part of your treatment and safety. Be sure to make and go to all appointments, and call your doctor if you are having problems. It's also a good idea to know your test results and keep a list of the medicines you take. When should you call for help? [id number] anytime you think you may need emergency care. For example, call if:    · You passed out (lost consciousness).     · You have symptoms of a heart attack. These may include:  ? Chest pain or pressure, or a strange feeling in the chest.  ? Sweating. ? Shortness of breath. ? Nausea or vomiting. ? Pain, pressure, or a strange feeling in the back, neck, jaw, or upper belly or in one or both shoulders or arms. ? Lightheadedness or sudden weakness. ? A fast or irregular heartbeat.    After you call 911, the  may tell you to chew 1 adult-strength or 2 to 4 low-dose aspirin. Wait for an ambulance. Do not try to drive yourself.   Call your doctor now or seek immediate medical care if:    · You are bleeding from the area where the catheter was put in.     · You have a fast-growing, painful lump at the catheter site.     · You have symptoms of infection, such as:  ? Increased pain, swelling, warmth, or redness. ? Red streaks leading from the area. ? Pus draining from the area. ? A fever.     · Your leg or arm looks blue or feels cold, numb, or tingly. Where can you learn more? Go to http://raimundo-joselyn.info/. Enter D050 in the search box to learn more about \"Right Heart Catheterization: What to Expect at Home. \"  Current as of: July 22, 2018  Content Version: 11.9  © 2094-1938 [employer]. Care instructions adapted under license by [employer] (which disclaims liability or warranty for this information). If you have questions about a medical condition or this instruction, always ask your healthcare professional. [address] any warranty or liability for your use of this information. DISCHARGE SUMMARY from Nurse    PATIENT INSTRUCTIONS:    After general anesthesia or intravenous sedation, for 24 hours or while taking prescription Narcotics:  · Limit your activities  · Do not drive and operate hazardous machinery  · Do not make important personal or business decisions  · Do  not drink alcoholic beverages  · If you have not urinated within 8 hours after discharge, please contact your surgeon on call. Report the following to your surgeon:  · Excessive pain, swelling, redness or odor of or around the surgical area  · Temperature over 100.5  · Nausea and vomiting lasting longer than 4 hours or if unable to take medications  · Any signs of decreased circulation or nerve impairment to extremity: change in color, persistent  numbness, tingling, coldness or increase pain  · Any questions    What to do at Home:  Recommended activity: Activity as tolerated,     *  Please give a list of your current medications to your Primary Care Provider. *  Please update this list whenever your medications are discontinued, doses are      changed, or new medications (including over-the-counter products) are added. *  Please carry medication information at all times in case of emergency situations. These are general instructions for a healthy lifestyle:    No smoking/ No tobacco products/ Avoid exposure to second hand smoke  Surgeon General's Warning:  Quitting smoking now greatly reduces serious risk to your health.     Obesity, smoking, and sedentary lifestyle greatly increases your risk for illness    A healthy diet, regular physical exercise & weight monitoring are important for maintaining a healthy lifestyle    You may be retaining fluid if you have a history of heart failure or if you experience any of the following symptoms:  Weight gain of 3 pounds or more overnight or 5 pounds in a week, increased swelling in our hands or feet or shortness of breath while lying flat in bed. Please call your doctor as soon as you notice any of these symptoms; do not wait until your next office visit. Recognize signs and symptoms of STROKE:    F-face looks uneven    A-arms unable to move or move unevenly    S-speech slurred or non-existent    T-time-call 911 as soon as signs and symptoms begin-DO NOT go       Back to bed or wait to see if you get better-TIME IS BRAIN. Warning Signs of HEART ATTACK     Call 911 if you have these symptoms:   Chest discomfort. Most heart attacks involve discomfort in the center of the chest that lasts more than a few minutes, or that goes away and comes back. It can feel like uncomfortable pressure, squeezing, fullness, or pain.  Discomfort in other areas of the upper body. Symptoms can include pain or discomfort in one or both arms, the back, neck, jaw, or stomach.  Shortness of breath with or without chest discomfort.  Other signs may include breaking out in a cold sweat, nausea, or lightheadedness. Don't wait more than five minutes to call 911 - MINUTES MATTER! Fast action can save your life. Calling 911 is almost always the fastest way to get lifesaving treatment. Emergency Medical Services staff can begin treatment when they arrive -- up to an hour sooner than if someone gets to the hospital by car. The discharge information has been reviewed with the patient. The patient verbalized understanding.   Discharge medications reviewed with the patient and appropriate educational materials and side effects teaching were provided.   ___________________________________________________________________________________________________________________________________

## 2019-05-22 NOTE — PROGRESS NOTES
Problem: Falls - Risk of  Goal: *Absence of Falls  Description  Document Mike Lara Fall Risk and appropriate interventions in the flowsheet.   Outcome: Progressing Towards Goal     Problem: Patient Education: Go to Patient Education Activity  Goal: Patient/Family Education  Outcome: Progressing Towards Goal     Problem: Patient Education: Go to Patient Education Activity  Goal: Patient/Family Education  Outcome: Progressing Towards Goal     Problem: Cath Lab Procedures: Pre-Procedure  Goal: Off Pathway (Use only if patient is Off Pathway)  Outcome: Progressing Towards Goal  Goal: Activity/Safety  Outcome: Progressing Towards Goal  Goal: Diagnostic Test/Procedures  Outcome: Progressing Towards Goal  Goal: Nutrition/Diet  Outcome: Progressing Towards Goal  Goal: Discharge Planning  Outcome: Progressing Towards Goal  Goal: Medications  Outcome: Progressing Towards Goal  Goal: Respiratory  Outcome: Progressing Towards Goal  Goal: Treatments/Interventions/Procedures  Outcome: Progressing Towards Goal  Goal: Psychosocial  Outcome: Progressing Towards Goal  Goal: *Verbalize description of procedure  Outcome: Progressing Towards Goal  Goal: *Consent signed  Outcome: Progressing Towards Goal     Problem: Pain  Goal: *Control of Pain  Outcome: Progressing Towards Goal  Goal: *PALLIATIVE CARE:  Alleviation of Pain  Outcome: Progressing Towards Goal     Problem: Patient Education: Go to Patient Education Activity  Goal: Patient/Family Education  Outcome: Progressing Towards Goal

## 2021-01-22 ENCOUNTER — TRANSCRIBE ORDER (OUTPATIENT)
Dept: REGISTRATION | Age: 66
End: 2021-01-22

## 2021-01-22 ENCOUNTER — HOSPITAL ENCOUNTER (OUTPATIENT)
Dept: PREADMISSION TESTING | Age: 66
Discharge: HOME | End: 2021-01-22

## 2021-01-22 DIAGNOSIS — Z01.812 BLOOD TESTS PRIOR TO TREATMENT OR PROCEDURE: ICD-10-CM

## 2021-01-22 DIAGNOSIS — M19.011 ARTHRITIS OF RIGHT SHOULDER REGION: Primary | ICD-10-CM

## 2021-01-22 DIAGNOSIS — M75.41 IMPINGEMENT SYNDROME OF RIGHT SHOULDER: ICD-10-CM

## 2021-01-22 DIAGNOSIS — M19.011 ARTHRITIS OF RIGHT SHOULDER REGION: ICD-10-CM

## 2021-01-22 DIAGNOSIS — M75.21 BICIPITAL TENDINITIS OF RIGHT SHOULDER: ICD-10-CM

## 2021-01-22 DIAGNOSIS — M25.511 RIGHT SHOULDER PAIN: ICD-10-CM

## 2021-02-07 NOTE — H&P
Patient Name:   Zarina Grimes  Account #:  [de-identified]  YOB: 1955    Chief Complaint:  Right shoulder pain; follow up MRI. History of Chief Complaint:  She had the MRI done for her right shoulder. This study it reviewed. It shows tendinopathy of the infraspinatus, supraspinatus and subscapularis with arthritic change in the glenohumeral space, osteoarthritis of the [facility] joint, but no true rotator cuff tear.     Past Medical/Surgical History:    Disease/Disorder Date Side Surgery Date Side Comment   Angina         Anxiety         Arthritis         Asthma         Blood clots         Depression         Diabetes         GERD         High cholesterol         Hypertension         Myocardial infarction         Sleep apnea         Spinal stenosis            Arthroscopy knee 2011 right       Arthroscopy shoulder 2013 left       Arthroscopy shoulder 2019 left       Cardiac stent implant 2019         section 1974        Hemorrhoidectomy 2018        Hysterectomy 1991        Spinal fusion, cervical 2013  [facility] [place] 2018 - C3-7   Allergies:    Ingredient Reaction Medication Name Comment   NSAIDS (NON-STEROIDAL ANTI-INFLAMMATORY DRUG)      HYDROCODONE BITARTRATE  Vicodin    ACETAMINOPHEN  Vicodin    PREDNISONE      IODINE AND IODIDE CONTAINING PRODUCTS        Current Medications:    Medication Directions   amitriptyline 10 mg tablet    aspirin 81 mg tablet,delayed release    Benadryl 25 mg capsule    biotin 10 mg tablet    Brilinta 90 mg tablet    clonidine HCl 0.2 mg tablet    Dilaudid 2 mg tablet take 1-2 tablets by oral route every 6 hours as needed *MAX 6 PER DAY*   ezetimibe 10 mg tablet    Fish Oil    lidocaine 5 % topical patch    losartan 25 mg tablet    Matzim  mg tablet,extended release    meclizine 25 mg tablet    metoprolol succinate ER 25 mg tablet,extended release 24 hr    multivitamin tablet    potassium chloride ER 20 mEq tablet,extended release(part/cryst) take 1 tablet by oral route once every other day   tramadol 50 mg tablet    valacyclovir 1 gram tablet    Vitamin B-12 500 mcg tablet    Zyrtec 10 mg tablet    Social History:    SMOKING  Status Tobacco Type Units Per Day Yrs Used   Never smoker      ALCOHOL  There is no history of alcohol use. Family History:    Disease Detail Family Member Age Cause of Death Comments   Family history of Renal disease   N    Heart disease Father  N    Alcoholism Father  N    Cancer, prostate Father  N    Diabetes mellitus Mother  N    Hypertension Mother  N    Arthritis Mother  N    Family history of Seizure disorder   N    Stroke Mother  N    Family history of Substance abuse   N    Review of Systems:    GENERAL:  Patient has no signs of fever. , chills or weight change  HEAD/ENTM:  Patient has no signs of recent cold. , headaches, dizziness, hearing loss, ringing in ears, sore throat/hoarseness, double vision, blurred vision, itchy eyes, eye redness or eye discharge  NEUROLOGIC: Patient presents with numbness/tingling. CARDIOVASCULAR:  Patient has no signs of chest pain, palpitations, rheumatic fever or heart murmur. RESPIRATORY:  Patient has no signs of shortness of breath. , chronic cough, wheezing, difficulty breathing or pain on breathing  GASTROINTESTINAL:  Patient has no signs of nausea/vomiting, difficulty swallowing, gas/bloating, indigestion, abdominal pain, diarrhea, bloody stools or hemorrhoids. GENITOURINARY:  Patient has no signs of blood in urine, painful urinating, burning sensation, bladder/kidney infection, frequent urinating or incontinence. MUSCULOSKELETAL: Patient presents with joint pain. Patient has no signs of fracture/dislocation, sprain/strain, tendonitis, joint stiffness, rheumatoid disease, gout or swelling of feet. INTEGUMENTARY:  Patient has no signs of rash/itching, psoriasis, Raynaud's phenomenon or varicose veins. EMOTIONAL:  Patient has no signs of change in mood. , anxiety, depression, bipolar disorder or memory loss    Vitals:  Date BP Pulse Temp (F) Resp. (per min.) Height (Total in.) Weight (lbs.) BMI   01/06/2021     64.00 181.00 31.07   11/25/2020     64.00  30.90   09/02/2020     64.00  30.90   07/29/2020     64.00  30.04   06/04/2020     64.00  30.04   01/16/2019     64.00  31.24   11/29/2018     64.00  31.24   08/27/2013 141/83 97  18 64.00  32.27   Physical Examination:  She has a slight improvement in shoulder motion reaching 90 degrees of forward flexion and 80 degrees of abduction. She has internal rotation only to the posterior iliac crest.  External rotation is 10 degrees beyond neutral.      Impression:  Impingement, right shoulder, with degenerative joint disease of the acromioclavicular joint and glenohumeral degenerative joint disease. Plan: We had a long discussion about the proper course of action. She is adamantly opposed to any further cortisone injections. For this reason, we discussed carrying out an arthroscopic decompression and resection of distal clavicle. She agrees to this plan and she accepts that this will not deal with the glenohumeral DJD at all. She was given a sling. Postop, she will get  Dilaudid. She will get clearance through her family doctor.

## 2021-02-08 ENCOUNTER — HOSPITAL ENCOUNTER (OUTPATIENT)
Dept: PREADMISSION TESTING | Age: 66
Discharge: HOME | End: 2021-02-08
Payer: MEDICARE

## 2021-02-08 PROCEDURE — U0003 INFECTIOUS AGENT DETECTION BY NUCLEIC ACID (DNA OR RNA); SEVERE ACUTE RESPIRATORY SYNDROME CORONAVIRUS 2 (SARS-COV-2) (CORONAVIRUS DISEASE [COVID-19]), AMPLIFIED PROBE TECHNIQUE, MAKING USE OF HIGH THROUGHPUT TECHNOLOGIES AS DESCRIBED BY CMS-2020-01-R: HCPCS

## 2021-02-09 LAB — SARS-COV-2, COV2NT: NOT DETECTED

## 2021-02-11 ENCOUNTER — ANESTHESIA EVENT (OUTPATIENT)
Dept: SURGERY | Age: 66
End: 2021-02-11
Payer: MEDICARE

## 2021-02-11 NOTE — ANESTHESIA PREPROCEDURE EVALUATION
Anesthetic History   No history of anesthetic complications            Review of Systems / Medical History  Patient summary reviewed, nursing notes reviewed and pertinent labs reviewed    Pulmonary        Sleep apnea: CPAP           Neuro/Psych         Psychiatric history     Cardiovascular    Hypertension: well controlled        Dysrhythmias   Past MI and CAD    Exercise tolerance: >4 METS     GI/Hepatic/Renal     GERD           Endo/Other    Diabetes: well controlled    Arthritis     Other Findings            Physical Exam    Airway  Mallampati: II  TM Distance: < 4 cm  Neck ROM: normal range of motion   Mouth opening: Normal     Cardiovascular    Rhythm: regular  Rate: normal         Dental  No notable dental hx    Comments: Broken back right tooth   Pulmonary  Breath sounds clear to auscultation               Abdominal  GI exam deferred       Other Findings            Anesthetic Plan    ASA: 3  Anesthesia type: general and regional - interscalene block      Post-op pain plan if not by surgeon: peripheral nerve block continuous          Risk of a block include nerve injury, bleeding, infection, and failure as the most common ones although they rare. Ga/lma/isb ss    Will recheck glucose after hydration.   She doesn't take anything presently

## 2021-02-12 ENCOUNTER — HOSPITAL ENCOUNTER (OUTPATIENT)
Age: 66
Setting detail: OUTPATIENT SURGERY
Discharge: HOME | End: 2021-02-12
Attending: ORTHOPAEDIC SURGERY | Admitting: ORTHOPAEDIC SURGERY
Payer: MEDICARE

## 2021-02-12 ENCOUNTER — ANESTHESIA (OUTPATIENT)
Dept: SURGERY | Age: 66
End: 2021-02-12
Payer: MEDICARE

## 2021-02-12 VITALS
DIASTOLIC BLOOD PRESSURE: 79 MMHG | WEIGHT: 188.19 LBS | RESPIRATION RATE: 16 BRPM | TEMPERATURE: 97 F | BODY MASS INDEX: 32.13 KG/M2 | OXYGEN SATURATION: 95 % | SYSTOLIC BLOOD PRESSURE: 114 MMHG | HEART RATE: 67 BPM | HEIGHT: 64 IN

## 2021-02-12 DIAGNOSIS — M75.41 ROTATOR CUFF IMPINGEMENT SYNDROME OF RIGHT SHOULDER: Primary | Chronic | ICD-10-CM

## 2021-02-12 LAB
GLUCOSE BLD STRIP.AUTO-MCNC: 122 MG/DL (ref 70–110)
GLUCOSE BLD STRIP.AUTO-MCNC: 139 MG/DL (ref 70–110)
GLUCOSE BLD STRIP.AUTO-MCNC: 158 MG/DL (ref 70–110)

## 2021-02-12 PROCEDURE — 77030020782 HC GWN BAIR PAWS FLX 3M -B: Performed by: ORTHOPAEDIC SURGERY

## 2021-02-12 PROCEDURE — 76942 ECHO GUIDE FOR BIOPSY: CPT | Performed by: ORTHOPAEDIC SURGERY

## 2021-02-12 PROCEDURE — 74011000250 HC RX REV CODE- 250: Performed by: NURSE ANESTHETIST, CERTIFIED REGISTERED

## 2021-02-12 PROCEDURE — 76210000006 HC OR PH I REC 0.5 TO 1 HR: Performed by: ORTHOPAEDIC SURGERY

## 2021-02-12 PROCEDURE — 77030034478 HC TU IRR ARTHRO PT ARTH -B: Performed by: ORTHOPAEDIC SURGERY

## 2021-02-12 PROCEDURE — C1713 ANCHOR/SCREW BN/BN,TIS/BN: HCPCS | Performed by: ORTHOPAEDIC SURGERY

## 2021-02-12 PROCEDURE — 74011000250 HC RX REV CODE- 250: Performed by: ORTHOPAEDIC SURGERY

## 2021-02-12 PROCEDURE — 74011250636 HC RX REV CODE- 250/636: Performed by: ORTHOPAEDIC SURGERY

## 2021-02-12 PROCEDURE — 77030002960 HC SUT PASS S&N -C: Performed by: ORTHOPAEDIC SURGERY

## 2021-02-12 PROCEDURE — 77030002916 HC SUT ETHLN J&J -A: Performed by: ORTHOPAEDIC SURGERY

## 2021-02-12 PROCEDURE — 77030040361 HC SLV COMPR DVT MDII -B: Performed by: ORTHOPAEDIC SURGERY

## 2021-02-12 PROCEDURE — 77030012711 HC WND ARTHRO ABLT S&N -D: Performed by: ORTHOPAEDIC SURGERY

## 2021-02-12 PROCEDURE — 82962 GLUCOSE BLOOD TEST: CPT

## 2021-02-12 PROCEDURE — 74011250636 HC RX REV CODE- 250/636: Performed by: NURSE ANESTHETIST, CERTIFIED REGISTERED

## 2021-02-12 PROCEDURE — 2709999900 HC NON-CHARGEABLE SUPPLY: Performed by: ORTHOPAEDIC SURGERY

## 2021-02-12 PROCEDURE — 76210000026 HC REC RM PH II 1 TO 1.5 HR: Performed by: ORTHOPAEDIC SURGERY

## 2021-02-12 PROCEDURE — 77030005326 HC CATH PAIN PMP/Q AVNM -C: Performed by: ORTHOPAEDIC SURGERY

## 2021-02-12 PROCEDURE — 74011250636 HC RX REV CODE- 250/636: Performed by: ANESTHESIOLOGY

## 2021-02-12 PROCEDURE — 77030006884 HC BLD SHV INCIS S&N -B: Performed by: ORTHOPAEDIC SURGERY

## 2021-02-12 PROCEDURE — 74011000250 HC RX REV CODE- 250: Performed by: ANESTHESIOLOGY

## 2021-02-12 PROCEDURE — 76060000033 HC ANESTHESIA 1 TO 1.5 HR: Performed by: ORTHOPAEDIC SURGERY

## 2021-02-12 PROCEDURE — 77030022877 HC TU IRR ARTHRO PMP ARTH -B: Performed by: ORTHOPAEDIC SURGERY

## 2021-02-12 PROCEDURE — 76010000149 HC OR TIME 1 TO 1.5 HR: Performed by: ORTHOPAEDIC SURGERY

## 2021-02-12 PROCEDURE — 77030004451 HC BUR SHV S&N -B: Performed by: ORTHOPAEDIC SURGERY

## 2021-02-12 DEVICE — MULTIFIX S-ULTRA 5.5MM KNOTLESS ANCHOR
Type: IMPLANTABLE DEVICE | Site: SHOULDER | Status: FUNCTIONAL
Brand: MULTIFIX

## 2021-02-12 RX ORDER — ONDANSETRON 2 MG/ML
INJECTION INTRAMUSCULAR; INTRAVENOUS AS NEEDED
Status: DISCONTINUED | OUTPATIENT
Start: 2021-02-12 | End: 2021-02-12 | Stop reason: HOSPADM

## 2021-02-12 RX ORDER — MAGNESIUM SULFATE 100 %
4 CRYSTALS MISCELLANEOUS AS NEEDED
Status: DISCONTINUED | OUTPATIENT
Start: 2021-02-12 | End: 2021-02-12 | Stop reason: HOSPADM

## 2021-02-12 RX ORDER — SODIUM CHLORIDE, SODIUM LACTATE, POTASSIUM CHLORIDE, CALCIUM CHLORIDE 600; 310; 30; 20 MG/100ML; MG/100ML; MG/100ML; MG/100ML
1000 INJECTION, SOLUTION INTRAVENOUS CONTINUOUS
Status: DISCONTINUED | OUTPATIENT
Start: 2021-02-12 | End: 2021-02-12 | Stop reason: HOSPADM

## 2021-02-12 RX ORDER — FENTANYL CITRATE 50 UG/ML
25 INJECTION, SOLUTION INTRAMUSCULAR; INTRAVENOUS AS NEEDED
Status: DISCONTINUED | OUTPATIENT
Start: 2021-02-12 | End: 2021-02-12 | Stop reason: HOSPADM

## 2021-02-12 RX ORDER — DEXTROSE MONOHYDRATE 100 MG/ML
125-250 INJECTION, SOLUTION INTRAVENOUS AS NEEDED
Status: DISCONTINUED | OUTPATIENT
Start: 2021-02-12 | End: 2021-02-12 | Stop reason: HOSPADM

## 2021-02-12 RX ORDER — CEFAZOLIN SODIUM/WATER 2 G/20 ML
2 SYRINGE (ML) INTRAVENOUS ONCE
Status: COMPLETED | OUTPATIENT
Start: 2021-02-12 | End: 2021-02-12

## 2021-02-12 RX ORDER — ROPIVACAINE HYDROCHLORIDE 5 MG/ML
INJECTION, SOLUTION EPIDURAL; INFILTRATION; PERINEURAL
Status: COMPLETED | OUTPATIENT
Start: 2021-02-12 | End: 2021-02-12

## 2021-02-12 RX ORDER — PROPOFOL 10 MG/ML
INJECTION, EMULSION INTRAVENOUS AS NEEDED
Status: DISCONTINUED | OUTPATIENT
Start: 2021-02-12 | End: 2021-02-12 | Stop reason: HOSPADM

## 2021-02-12 RX ORDER — INSULIN LISPRO 100 [IU]/ML
INJECTION, SOLUTION INTRAVENOUS; SUBCUTANEOUS ONCE
Status: DISCONTINUED | OUTPATIENT
Start: 2021-02-12 | End: 2021-02-12 | Stop reason: HOSPADM

## 2021-02-12 RX ORDER — HYDROMORPHONE HYDROCHLORIDE 2 MG/1
2 TABLET ORAL
Qty: 50 TAB | Refills: 0 | Status: SHIPPED
Start: 2021-02-12 | End: 2021-02-19

## 2021-02-12 RX ORDER — LIDOCAINE HYDROCHLORIDE 20 MG/ML
INJECTION, SOLUTION EPIDURAL; INFILTRATION; INTRACAUDAL; PERINEURAL AS NEEDED
Status: DISCONTINUED | OUTPATIENT
Start: 2021-02-12 | End: 2021-02-12 | Stop reason: HOSPADM

## 2021-02-12 RX ORDER — MIDAZOLAM HYDROCHLORIDE 1 MG/ML
INJECTION, SOLUTION INTRAMUSCULAR; INTRAVENOUS
Status: COMPLETED | OUTPATIENT
Start: 2021-02-12 | End: 2021-02-12

## 2021-02-12 RX ORDER — NALOXONE HYDROCHLORIDE 0.4 MG/ML
0.1 INJECTION, SOLUTION INTRAMUSCULAR; INTRAVENOUS; SUBCUTANEOUS AS NEEDED
Status: DISCONTINUED | OUTPATIENT
Start: 2021-02-12 | End: 2021-02-12 | Stop reason: HOSPADM

## 2021-02-12 RX ORDER — FENTANYL CITRATE 50 UG/ML
INJECTION, SOLUTION INTRAMUSCULAR; INTRAVENOUS AS NEEDED
Status: DISCONTINUED | OUTPATIENT
Start: 2021-02-12 | End: 2021-02-12 | Stop reason: HOSPADM

## 2021-02-12 RX ORDER — FLUMAZENIL 0.1 MG/ML
0.2 INJECTION INTRAVENOUS
Status: DISCONTINUED | OUTPATIENT
Start: 2021-02-12 | End: 2021-02-12 | Stop reason: HOSPADM

## 2021-02-12 RX ORDER — DEXMEDETOMIDINE HYDROCHLORIDE 100 UG/ML
INJECTION, SOLUTION INTRAVENOUS
Status: COMPLETED | OUTPATIENT
Start: 2021-02-12 | End: 2021-02-12

## 2021-02-12 RX ORDER — FENTANYL CITRATE 50 UG/ML
50 INJECTION, SOLUTION INTRAMUSCULAR; INTRAVENOUS
Status: DISCONTINUED | OUTPATIENT
Start: 2021-02-12 | End: 2021-02-12 | Stop reason: HOSPADM

## 2021-02-12 RX ORDER — MIDAZOLAM HYDROCHLORIDE 1 MG/ML
INJECTION, SOLUTION INTRAMUSCULAR; INTRAVENOUS
Status: DISCONTINUED
Start: 2021-02-12 | End: 2021-02-12 | Stop reason: HOSPADM

## 2021-02-12 RX ORDER — MIDAZOLAM HYDROCHLORIDE 1 MG/ML
INJECTION, SOLUTION INTRAMUSCULAR; INTRAVENOUS AS NEEDED
Status: DISCONTINUED | OUTPATIENT
Start: 2021-02-12 | End: 2021-02-12 | Stop reason: HOSPADM

## 2021-02-12 RX ORDER — SODIUM CHLORIDE 0.9 % (FLUSH) 0.9 %
5-40 SYRINGE (ML) INJECTION AS NEEDED
Status: DISCONTINUED | OUTPATIENT
Start: 2021-02-12 | End: 2021-02-12 | Stop reason: HOSPADM

## 2021-02-12 RX ORDER — SODIUM CHLORIDE, SODIUM LACTATE, POTASSIUM CHLORIDE, CALCIUM CHLORIDE 600; 310; 30; 20 MG/100ML; MG/100ML; MG/100ML; MG/100ML
125 INJECTION, SOLUTION INTRAVENOUS CONTINUOUS
Status: DISCONTINUED | OUTPATIENT
Start: 2021-02-12 | End: 2021-02-12 | Stop reason: HOSPADM

## 2021-02-12 RX ORDER — GLYCOPYRROLATE 0.2 MG/ML
INJECTION INTRAMUSCULAR; INTRAVENOUS AS NEEDED
Status: DISCONTINUED | OUTPATIENT
Start: 2021-02-12 | End: 2021-02-12 | Stop reason: HOSPADM

## 2021-02-12 RX ORDER — SODIUM CHLORIDE 0.9 % (FLUSH) 0.9 %
5-40 SYRINGE (ML) INJECTION EVERY 8 HOURS
Status: DISCONTINUED | OUTPATIENT
Start: 2021-02-12 | End: 2021-02-12 | Stop reason: HOSPADM

## 2021-02-12 RX ORDER — BUPIVACAINE HYDROCHLORIDE AND EPINEPHRINE 5; 5 MG/ML; UG/ML
INJECTION, SOLUTION EPIDURAL; INTRACAUDAL; PERINEURAL AS NEEDED
Status: DISCONTINUED | OUTPATIENT
Start: 2021-02-12 | End: 2021-02-12 | Stop reason: HOSPADM

## 2021-02-12 RX ADMIN — LIDOCAINE HYDROCHLORIDE 80 MG: 20 INJECTION, SOLUTION EPIDURAL; INFILTRATION; INTRACAUDAL; PERINEURAL at 10:01

## 2021-02-12 RX ADMIN — Medication 2 G: at 09:53

## 2021-02-12 RX ADMIN — DEXMEDETOMIDINE HYDROCHLORIDE 20 MCG: 100 INJECTION, SOLUTION INTRAVENOUS at 08:17

## 2021-02-12 RX ADMIN — FENTANYL CITRATE 100 MCG: 50 INJECTION, SOLUTION INTRAMUSCULAR; INTRAVENOUS at 10:01

## 2021-02-12 RX ADMIN — PROPOFOL 200 MG: 10 INJECTION, EMULSION INTRAVENOUS at 10:01

## 2021-02-12 RX ADMIN — SODIUM CHLORIDE, SODIUM LACTATE, POTASSIUM CHLORIDE, AND CALCIUM CHLORIDE 125 ML/HR: 600; 310; 30; 20 INJECTION, SOLUTION INTRAVENOUS at 07:45

## 2021-02-12 RX ADMIN — ROPIVACAINE HYDROCHLORIDE 25 ML: 5 INJECTION, SOLUTION EPIDURAL; INFILTRATION; PERINEURAL at 08:17

## 2021-02-12 RX ADMIN — SODIUM CHLORIDE, SODIUM LACTATE, POTASSIUM CHLORIDE, AND CALCIUM CHLORIDE 125 ML/HR: 600; 310; 30; 20 INJECTION, SOLUTION INTRAVENOUS at 09:37

## 2021-02-12 RX ADMIN — MIDAZOLAM 2 MG: 1 INJECTION INTRAMUSCULAR; INTRAVENOUS at 08:17

## 2021-02-12 RX ADMIN — GLYCOPYRROLATE 0.2 MG: 0.2 INJECTION INTRAMUSCULAR; INTRAVENOUS at 09:50

## 2021-02-12 RX ADMIN — ONDANSETRON HYDROCHLORIDE 4 MG: 2 INJECTION INTRAMUSCULAR; INTRAVENOUS at 10:03

## 2021-02-12 RX ADMIN — MIDAZOLAM 2 MG: 1 INJECTION INTRAMUSCULAR; INTRAVENOUS at 09:50

## 2021-02-12 NOTE — ANESTHESIA POSTPROCEDURE EVALUATION
Procedure(s):  RIGHT SHOULDER ARTHROSCOPIC DECOMPRESSION, RESECTION DISTAL CLAVICLE BICEPS RELEASE, ROTATOR CUFF REPAIR, GEN WITH SCALENE BLOCK.    general, regional    Anesthesia Post Evaluation        Comments: Post-Anesthesia Evaluation and Assessment    Cardiovascular Function/Vital Signs  /81   Pulse 84   Temp 36.6 °C (97.9 °F)   Resp 13   Ht 5' 4\" (1.626 m)   Wt 85.4 kg (188 lb 3 oz)   SpO2 98%   BMI 32.30 kg/m²     Patient is status post Procedure(s):  RIGHT SHOULDER ARTHROSCOPIC DECOMPRESSION, RESECTION DISTAL CLAVICLE BICEPS RELEASE, ROTATOR CUFF REPAIR, GEN WITH SCALENE BLOCK. Nausea/Vomiting: Controlled. Postoperative hydration reviewed and adequate. Pain:  Pain Scale 1: Numeric (0 - 10) (02/12/21 1145)  Pain Intensity 1: 0 (02/12/21 1145)   Managed. Neurological Status:   Neuro (WDL): Exceptions to WDL (02/12/21 1145)   At baseline. Mental Status and Level of Consciousness: Arousable. Pulmonary Status:   O2 Device: Nasal cannula (02/12/21 1140)   Adequate oxygenation and airway patent. Complications related to anesthesia: None    Post-anesthesia assessment completed. No concerns. Patient has met all discharge requirements. Signed By: Amy Stein MD    February 12, 2021                   INITIAL Post-op Vital signs:   Vitals Value Taken Time   /81 02/12/21 1145   Temp 36.6 °C (97.9 °F) 02/12/21 1120   Pulse 81 02/12/21 1148   Resp 11 02/12/21 1148   SpO2 97 % 02/12/21 1148   Vitals shown include unvalidated device data.

## 2021-02-12 NOTE — PERIOP NOTES
Spoke with Dr. Benton Snellen and informed her of patient's blood glucose of 158 and was given order to open fluids completely and hydrate patient, recheck glucose in 30-45 minutes to see if levels are below 150, if above 150 treat with 2 units of humalog insulin.

## 2021-02-12 NOTE — PERIOP NOTES
Family member called for  40 minutes ago. Said they live 5-10 minutes away when told that patient was ready for discharge.

## 2021-02-12 NOTE — PERIOP NOTES
Attempted to update family, no answer.     Dr. Pam Brantley updated family of patient current status

## 2021-02-12 NOTE — PERIOP NOTES
Reviewed PTA medication list with patient/caregiver and patient/caregiver denies any additional medications. Patient admits to having a responsible adult care for them at home for at least 24 hours after surgery. Patient encouraged to use gown warming system and informed that using said warming gown to regulate body temperature prior to a procedure has been shown to help reduce the risks of blood clots and infection. Patient's pharmacy of choice verified and documented in PTA medication section. Dual skin assessment & fall risk band verification completed with Meño Jhaveri RN.

## 2021-02-12 NOTE — ANESTHESIA PROCEDURE NOTES
Peripheral Block    Start time: 2/12/2021 8:17 AM  End time: 2/12/2021 8:23 AM  Performed by: Carter Rudd MD  Authorized by: Carter Rudd MD       Pre-procedure: Indications: at surgeon's request and procedure for pain    Preanesthetic Checklist: patient identified, risks and benefits discussed, site marked, timeout performed, anesthesia consent given and patient being monitored    Timeout Time: 08:17          Block Type:   Block Type:   Interscalene  Laterality:  Right  Monitoring:  Standard ASA monitoring, continuous pulse ox, frequent vital sign checks, heart rate, responsive to questions and oxygen  Injection Technique:  Single shot  Procedures: ultrasound guided    Patient Position: seated  Prep: chlorhexidine    Location:  Interscalene  Needle Type:  Stimuplex  Needle Gauge:  22 G  Needle Localization:  Anatomical landmarks and ultrasound guidance  Medication Injected:  Midazolam (VERSED) injection, 2 mg  ropivacaine (PF) (NAROPIN)(0.5%) 5 mg/mL injection, 25 mL  dexmedeTOMidine (PRECEDEX) 100 mcg/mL iv solution, 20 mcg  Med Admin Time: 2/12/2021 8:17 AM    Assessment:  Number of attempts:  1  Injection Assessment:  Incremental injection every 5 mL, local visualized surrounding nerve on ultrasound, negative aspiration for CSF, negative aspiration for blood, no paresthesia, no intravascular symptoms and ultrasound image on chart  Patient tolerance:  Patient tolerated the procedure well with no immediate complications

## 2021-02-12 NOTE — DISCHARGE INSTRUCTIONS
DISCHARGE SUMMARY from Nurse    PATIENT INSTRUCTIONS:    After general anesthesia or intravenous sedation, for 24 hours or while taking prescription Narcotics:  · Limit your activities  · Do not drive and operate hazardous machinery  · Do not make important personal or business decisions  · Do  not drink alcoholic beverages  · If you have not urinated within 8 hours after discharge, please contact your surgeon on call. Report the following to your surgeon:  · Excessive pain, swelling, redness or odor of or around the surgical area  · Temperature over 100.5  · Nausea and vomiting lasting longer than 4 hours or if unable to take medications  · Any signs of decreased circulation or nerve impairment to extremity: change in color, persistent  numbness, tingling, coldness or increase pain  · Any questions    What to do at Home:  Recommended activity: As recommended by your Surgeon. If you experience any of the following symptoms fever above 101, pain not relieved by prescribed medications, foul smelling drainage coming from incision, please follow up with Dr. Azul Reynoso. *  Please give a list of your current medications to your Primary Care Provider. *  Please update this list whenever your medications are discontinued, doses are      changed, or new medications (including over-the-counter products) are added. *  Please carry medication information at all times in case of emergency situations. These are general instructions for a healthy lifestyle:    No smoking/ No tobacco products/ Avoid exposure to second hand smoke  Surgeon General's Warning:  Quitting smoking now greatly reduces serious risk to your health.     Obesity, smoking, and sedentary lifestyle greatly increases your risk for illness    A healthy diet, regular physical exercise & weight monitoring are important for maintaining a healthy lifestyle    You may be retaining fluid if you have a history of heart failure or if you experience any of the following symptoms:  Weight gain of 3 pounds or more overnight or 5 pounds in a week, increased swelling in our hands or feet or shortness of breath while lying flat in bed. Please call your doctor as soon as you notice any of these symptoms; do not wait until your next office visit. The discharge information has been reviewed with the patient and caregiver. The patient and caregiver verbalized understanding. Discharge medications reviewed with the patient and caregiver and appropriate educational materials and side effects teaching were provided. Learning About Coronavirus [phone])  What is coronavirus (COVID-19)? COVID-19 is a disease caused by a new type of coronavirus. This illness was first found in December 2019. It has since spread worldwide. Coronaviruses are a large group of viruses. They cause the common cold. They also cause more serious illnesses like [place] respiratory syndrome (MERS) and severe acute respiratory syndrome (SARS). COVID-19 is caused by a novel coronavirus. That means it's a new type that has not been seen in people before. What are the symptoms? Coronavirus (COVID-19) symptoms may include:  · Fever. · Cough. · Trouble breathing. · Chills or repeated shaking with chills. · Muscle pain. · Headache. · Sore throat. · New loss of taste or smell. · Vomiting. · Diarrhea. In severe cases, COVID-19 can cause pneumonia and make it hard to breathe without help from a machine. It can cause death. How is it diagnosed? COVID-19 is diagnosed with a viral test. This may also be called a PCR test or antigen test. It looks for evidence of the virus in your breathing passages or lungs (respiratory system). The test is most often done on a sample from the nose, throat, or lungs. It's sometimes done on a sample of saliva. One way a sample is collected is by putting a long swab into the back of your nose. How is it treated?   Mild cases of COVID-19 can be treated at home. Serious cases need treatment in the hospital. Treatment may include medicines to reduce symptoms, plus breathing support such as oxygen therapy or a ventilator. Some people may be placed on their belly to help their oxygen levels. Treatments that may help people who have COVID-19 include:  Antiviral medicines. These medicines treat viral infections. Remdesivir is an example. Immune-based therapy. These medicines help the immune system fight COVID-19. One example is bamlanivimab. It's a monoclonal antibody. Blood thinners. These medicines help prevent blood clots. People with severe illness are at risk for blood clots. How can you protect yourself and others? The best way to protect yourself from getting sick is to:  · Avoid areas where there is an outbreak. · Avoid contact with people who may be infected. · Avoid crowds and try to stay at least 6 feet away from other people. · Wash your hands often, especially after you cough or sneeze. Use soap and water, and scrub for at least 20 seconds. If soap and water aren't available, use an alcohol-based hand . · Avoid touching your mouth, nose, and eyes. To help avoid spreading the virus to others:  · Stay home if you are sick or have been exposed to the virus. Don't go to school, work, or public areas. And don't use public transportation, ride-shares, or taxis unless you have no choice. · Wear a cloth face cover if you have to go to public areas. · Cover your mouth with a tissue when you cough or sneeze. Then throw the tissue in the trash and wash your hands right away. · If you're sick:  ? Leave your home only if you need to get medical care. But call the doctor's office first so they know you're coming. And wear a face cover. ? Wear the face cover whenever you're around other people. It can help stop the spread of the virus when you cough or sneeze. ? Limit contact with pets and people in your home.  If possible, stay in a separate bedroom and use a separate bathroom. ? Clean and disinfect your home every day. Use household  and disinfectant wipes or sprays. Take special care to clean things that you grab with your hands. These include doorknobs, remote controls, phones, and handles on your refrigerator and microwave. And don't forget countertops, tabletops, bathrooms, and computer keyboards. When should you call for help? Call 911 anytime you think you may need emergency care. For example, call if you have life-threatening symptoms, such as:    · You have severe trouble breathing. (You can't talk at all.)     · You have constant chest pain or pressure.     · You are severely dizzy or lightheaded.     · You are confused or can't think clearly.     · Your face and lips have a blue color.     · You pass out (lose consciousness) or are very hard to wake up. Call your doctor now or seek immediate medical care if:    · You have moderate trouble breathing. (You can't speak a full sentence.)     · You are coughing up blood (more than about 1 teaspoon).     · You have signs of low blood pressure. These include feeling lightheaded; being too weak to stand; and having cold, pale, clammy skin. Watch closely for changes in your health, and be sure to contact your doctor if:    · Your symptoms get worse.     · You are not getting better as expected. Call before you go to the doctor's office. Follow their instructions. And wear a cloth face cover. Current as of: December 18, 2020               Content Version: 12.7  © 2006-2021 [employer]. Care instructions adapted under license by [employer] (which disclaims liability or warranty for this information).  If you have questions about a medical condition or this instruction, always ask your healthcare professional. [place] [address] any warranty or liability for your use of this information.   ________________________________________________________________________________________________________________________________

## 2021-02-12 NOTE — INTERVAL H&P NOTE
Telephone Encounter by Suzanne Barrow, RN, BSN at 01/08/18 09:50 AM     Author:  Suzanne Barrow RN, BSN Service:  (none) Author Type:  Registered Nurse     Filed:  01/08/18 09:50 AM Encounter Date:  1/2/2018 Status:  Signed     :  Suzanne Barrow RN, BSN (Registered Nurse)            Noted. Patient notified via [facility].[JH1.1M]    Revision History        User Key Date/Time User Provider Type Action    > JH1.1 01/08/18 09:50 AM Suzanne Barrow, RN, BSN Registered Nurse Sign    M - Manual             Update History & Physical 
 
The Patient's History and Physical of February 4, 2021 was reviewed with the patient and I examined the patient. There was no change. The surgical site was confirmed by the patient and me. Plan:  The risk, benefits, expected outcome, and alternative to the recommended procedure have been discussed with the patient. Patient understands and wants to proceed with the procedure.  
 
Electronically signed by Narinder Celestin MD on 2/12/2021 at 9:35 AM

## 2021-02-12 NOTE — OP NOTES
PREOPERATIVE DIAGNOSES:    1. Impingement. 2. Degenerative arthritis of the acromioclavicular joint. POSTOPERATIVE DIAGNOSES:    1. Rotator cuff tear, right shoulder  2. Impingement. 3. Degenerative arthritis of the acromioclavicular and glenohumeral joints. 4. Biceps partial tear     PROCEDURES PERFORMED:    1. Arthroscopic decompression. 2. Resection distal clavicle. 3. Single Row Rotator cuff repair, right shoulder. 4. Biceps release    SURGEON:  Pierre Cramer. Christina Guillaume MD    ANESTHESIOLOGIST:  Anesthesiologist: Deven Camejo MD  CRNA: Rogelio Simon CRNA; Anselmo Lucas CRNA    ASSISTANTS: Circ-1: Branden Schwartz RN  Scrub Tech-1: Luma Aguilar  Surg Asst-1: Raisa Alcocer  Surg Asst-Relief: Lazaro [address] [place] [address] Avenue:   General anesthesia after scalene block. COMPLICATIONS:  None. BLOOD LOSS:  Minimal.      SPECIMENS: None    DESCRIPTION OF PROCEDURE:  This 72y.o.-year-old female, with a history of persistent pain in the right shoulder, unresponsive to conservative care. The patient had a MRI showing the above noted findings and was then scheduled for surgery. PROCEDURE:  The patient was taken to the operating room and given general anesthesia after a scalene block. When it was adequate, the rightshoulder was examined and showed full motion with no gross instability. The patient was placed in a left lateral decubitus position. The right shoulder was placed in traction, prepped and draped in a normal manner and injected with 30 mL of 1% Marcaine with Epinephrine. Anterior and posterior stab wounds were made, and a standard arthroscopic examination was performed. This revealed a tear of the supraspinatus and the undersurface of the tear was debrided until all of the nonviable tissue was removed. The biceps showed extensive partial tearing and the articular surfaces of the joint appeared moderately arthritic.   The electrocautery wand was used to release the intra-articular  portion of the biceps and the remaining superior labrum was smoothed. The instruments were then redirected in the subacromial space. A lateral portal was established and a limited bursectomy was carried out. The full thickness nature of the rotator cuff tear was confirmed. The cuff was mobilized and could be brought back down into anatomic position without difficulty. The soft tissue was removed from the anterior acromion and distal clavicle, including the coracoacromial ligament. There was a sharp hooked appearance on the anterior acromion, and severe arthritic changes of the [facility] joint with complete loss of joint cartilage, and large inferior osteophytes. A high-speed bur was used to perform an acromioplasty. The same level of resection was carried across the distal clavicle. The arthroscope was switched to the lateral portal to assure that adequate bone removal had been achieved, and the result was a flat acromion. The [facility] joint was approached directly through the anterior portal.  The most medial few millimeters of the acromion and the distal centimeter of the clavicle were removed arthroscopically. Bleeding points were treated with the electrocautery wand for hemostasis. The original footprint was cleaned of soft tissue and a high-speed bur was used to create a bleeding surface. A single row Ultra-Tape technique was used for the repair. Two Ultra-Tape sutures were passed through the edge of the cuff in a vertical mattress technique. They were then secured at the footprint with two Multifix S 5.5mm PEEK anchors resulting in a solid repair. The instruments were removed. The wounds were closed using 3-0 nylon suture. A sterile compressive dressing was applied, along with a sling. The patient left the operating room in satisfactory condition.

## 2022-03-18 PROBLEM — E11.9 DIABETES (HCC): Status: ACTIVE | Noted: 2019-05-19

## 2022-03-19 PROBLEM — M75.41 ROTATOR CUFF IMPINGEMENT SYNDROME OF RIGHT SHOULDER: Status: ACTIVE | Noted: 2021-02-12

## 2022-03-19 PROBLEM — I21.4 NSTEMI (NON-ST ELEVATED MYOCARDIAL INFARCTION) (HCC): Status: ACTIVE | Noted: 2019-05-19

## 2022-03-19 PROBLEM — R07.9 ACUTE CHEST PAIN: Status: ACTIVE | Noted: 2019-05-19

## 2022-03-19 PROBLEM — M75.42 ROTATOR CUFF IMPINGEMENT SYNDROME OF LEFT SHOULDER: Status: ACTIVE | Noted: 2019-01-07

## 2022-12-21 ENCOUNTER — HOSPITAL ENCOUNTER (OUTPATIENT)
Dept: NON INVASIVE DIAGNOSTICS | Age: 67
Discharge: HOME | End: 2022-12-21
Payer: MEDICARE

## 2022-12-21 ENCOUNTER — HOSPITAL ENCOUNTER (OUTPATIENT)
Dept: LAB | Age: 67
Discharge: HOME | End: 2022-12-21
Payer: MEDICARE

## 2022-12-21 ENCOUNTER — TRANSCRIBE ORDER (OUTPATIENT)
Dept: REGISTRATION | Age: 67
End: 2022-12-21

## 2022-12-21 DIAGNOSIS — Z01.812 BLOOD TESTS PRIOR TO TREATMENT OR PROCEDURE: Primary | ICD-10-CM

## 2022-12-21 DIAGNOSIS — S83.242A ACUTE MEDIAL MENISCUS TEAR OF LEFT KNEE: ICD-10-CM

## 2022-12-21 DIAGNOSIS — Z01.812 BLOOD TESTS PRIOR TO TREATMENT OR PROCEDURE: ICD-10-CM

## 2022-12-21 LAB
ALBUMIN SERPL-MCNC: 3.8 G/DL (ref 3.4–5)
ALBUMIN/GLOB SERPL: 1.2 {RATIO} (ref 0.8–1.7)
ALP SERPL-CCNC: 147 U/L (ref 45–117)
ALT SERPL-CCNC: 21 U/L (ref 13–56)
ANION GAP SERPL CALC-SCNC: 0 MMOL/L (ref 3–18)
AST SERPL-CCNC: 9 U/L (ref 10–38)
BASOPHILS # BLD: 0 K/UL (ref 0–0.1)
BASOPHILS NFR BLD: 0 % (ref 0–2)
BILIRUB SERPL-MCNC: 0.3 MG/DL (ref 0.2–1)
BUN SERPL-MCNC: 7 MG/DL (ref 7–18)
BUN/CREAT SERPL: 9 (ref 12–20)
CALCIUM SERPL-MCNC: 10.2 MG/DL (ref 8.5–10.1)
CHLORIDE SERPL-SCNC: 107 MMOL/L (ref 100–111)
CO2 SERPL-SCNC: 35 MMOL/L (ref 21–32)
CREAT SERPL-MCNC: 0.75 MG/DL (ref 0.6–1.3)
DIFFERENTIAL METHOD BLD: ABNORMAL
EOSINOPHIL # BLD: 0.7 K/UL (ref 0–0.4)
EOSINOPHIL NFR BLD: 5 % (ref 0–5)
ERYTHROCYTE [DISTWIDTH] IN BLOOD BY AUTOMATED COUNT: 17.1 % (ref 11.6–14.5)
GLOBULIN SER CALC-MCNC: 3.3 G/DL (ref 2–4)
GLUCOSE SERPL-MCNC: 131 MG/DL (ref 74–99)
HCT VFR BLD AUTO: 39.6 % (ref 35–45)
HGB BLD-MCNC: 11.8 G/DL (ref 12–16)
IMM GRANULOCYTES # BLD AUTO: 0 K/UL (ref 0–0.04)
IMM GRANULOCYTES NFR BLD AUTO: 0 % (ref 0–0.5)
LYMPHOCYTES # BLD: 3.8 K/UL (ref 0.9–3.6)
LYMPHOCYTES NFR BLD: 29 % (ref 21–52)
MCH RBC QN AUTO: 23.9 PG (ref 24–34)
MCHC RBC AUTO-ENTMCNC: 29.8 G/DL (ref 31–37)
MCV RBC AUTO: 80.2 FL (ref 78–100)
MONOCYTES # BLD: 1 K/UL (ref 0.05–1.2)
MONOCYTES NFR BLD: 7 % (ref 3–10)
NEUTS SEG # BLD: 7.7 K/UL (ref 1.8–8)
NEUTS SEG NFR BLD: 58 % (ref 40–73)
NRBC # BLD: 0.02 K/UL (ref 0–0.01)
NRBC BLD-RTO: 0.2 PER 100 WBC
PLATELET # BLD AUTO: 340 K/UL (ref 135–420)
PMV BLD AUTO: 8.9 FL (ref 9.2–11.8)
POTASSIUM SERPL-SCNC: 3.9 MMOL/L (ref 3.5–5.5)
PROT SERPL-MCNC: 7.1 G/DL (ref 6.4–8.2)
RBC # BLD AUTO: 4.94 M/UL (ref 4.2–5.3)
SODIUM SERPL-SCNC: 142 MMOL/L (ref 136–145)
WBC # BLD AUTO: 13.3 K/UL (ref 4.6–13.2)

## 2022-12-21 PROCEDURE — 36415 COLL VENOUS BLD VENIPUNCTURE: CPT

## 2022-12-21 PROCEDURE — 85025 COMPLETE CBC W/AUTO DIFF WBC: CPT

## 2022-12-21 PROCEDURE — 80053 COMPREHEN METABOLIC PANEL: CPT

## 2023-05-20 RX ORDER — AMITRIPTYLINE HYDROCHLORIDE 10 MG/1
10 TABLET, FILM COATED ORAL NIGHTLY
COMMUNITY

## 2023-05-20 RX ORDER — GABAPENTIN 100 MG/1
300 CAPSULE ORAL 2 TIMES DAILY PRN
COMMUNITY

## 2023-05-20 RX ORDER — LIDOCAINE 50 MG/G
1 PATCH TOPICAL EVERY 24 HOURS
COMMUNITY

## 2023-05-20 RX ORDER — ASPIRIN 81 MG/1
81 TABLET ORAL DAILY
COMMUNITY
Start: 2019-05-22

## 2023-05-20 RX ORDER — CLONIDINE HYDROCHLORIDE 0.2 MG/1
0.2 TABLET ORAL 2 TIMES DAILY
COMMUNITY

## 2023-05-20 RX ORDER — SENNOSIDES 8.6 MG
1300 CAPSULE ORAL EVERY 8 HOURS PRN
COMMUNITY

## 2023-05-20 RX ORDER — CETIRIZINE HYDROCHLORIDE 10 MG/1
10 TABLET ORAL DAILY PRN
COMMUNITY

## 2023-05-20 RX ORDER — VALACYCLOVIR HYDROCHLORIDE 1 G/1
TABLET, FILM COATED ORAL DAILY
COMMUNITY

## 2023-05-20 RX ORDER — ASCORBIC ACID 500 MG
1000 TABLET ORAL DAILY
COMMUNITY

## 2023-05-20 RX ORDER — LOSARTAN POTASSIUM 25 MG/1
25 TABLET ORAL DAILY
COMMUNITY
Start: 2019-05-23

## 2024-01-18 NOTE — ED NOTES
Call to patient.   No answer. Left message for her to call back.    Pt hourly rounding competed. Safety Pt (X) resting on stretcher with side rails up and call bell in reach. () in chair 
  () in parents arms. Toileting Pt offered ()Bedpan 
   ()Assistance to Restroom 
   ()Urinal 
Ongoing UpdatesUpdated on plan of care and status of test results. Pain Management Inquired as to comfort and offered comfort measures: 
  () warm blankets (X) dimmed lights

## (undated) DEVICE — Device

## (undated) DEVICE — ULTRATAPE SUTURE COBRAID BLUE, 6                                    PER BOX: Brand: ULTRATAPE

## (undated) DEVICE — Device: Brand: EAGLE EYE PLATINUM RX DIGITAL IVUS CATHETER

## (undated) DEVICE — KENDALL SCD EXPRESS SLEEVES, KNEE LENGTH, LARGE: Brand: KENDALL SCD

## (undated) DEVICE — SOLUTION IRRIG 3000ML LAC R FLX CONT

## (undated) DEVICE — KENDALL SCD EXPRESS SLEEVES, KNEE LENGTH, MEDIUM: Brand: KENDALL SCD

## (undated) DEVICE — 4-PORT MANIFOLD: Brand: NEPTUNE 2

## (undated) DEVICE — SUT ETHLN 3-0 18IN PS2 BLK --

## (undated) DEVICE — GARMENT,MEDLINE,DVT,INT,CALF,MED, GEN2: Brand: MEDLINE

## (undated) DEVICE — 4.5 MM INCISOR PLUS STRAIGHT                                    BLADES, POWER/EP-1, VIOLET, PACKAGED                                    6 PER BOX, STERILE

## (undated) DEVICE — CANISTER SUCTION HI FLO 30000CC FLUID MGMT SYS TRUCLEAR DISP

## (undated) DEVICE — TREK CORONARY DILATATION CATHETER 2.50 MM X 12 MM / RAPID-EXCHANGE: Brand: TREK

## (undated) DEVICE — 3M™ STERI-DRAPE™ INCISE DRAPE 1050 (60CM X 45CM): Brand: STERI-DRAPE™

## (undated) DEVICE — CATH DIAG FR4 EXPO 5FRX100CM -- EXPO

## (undated) DEVICE — SHOULDER SUSPENSION KIT 6 PER BOX

## (undated) DEVICE — COPILOT KIT INCLUDES BLEEDBACK CONTROL VALVE 20/30 INDEFLATOR INFLATION DEVICE 30 ATM 20 CC / GUIDE WIRE INTRODUCER / TORQUE DEVICE: Brand: INDEFLATOR

## (undated) DEVICE — GLIDESHEATH SLENDER STAINLESS STEEL KIT: Brand: GLIDESHEATH SLENDER

## (undated) DEVICE — KIT INFUS PMP 270ML 2M/HR SOAK CATH L2.5IN N NARC ON-Q

## (undated) DEVICE — CRITICAL MEASUREMENT CANISTER: Brand: DEROYAL

## (undated) DEVICE — PROCEDURE KIT FLUID MGMT 10 FR CUST MAINFOLD

## (undated) DEVICE — HI-TORQUE BALANCE MIDDLEWEIGHT GUIDE WIRE W/HYDROCOAT .014 STRAIGHT TIP 3.0 CM X 190 CM: Brand: HI-TORQUE BALANCE MIDDLEWEIGHT

## (undated) DEVICE — PREP SKN PREVAIL 40ML APPL --

## (undated) DEVICE — 5.5 MM ACROMIONIZER STRAIGHT                                    BURRS, POWER/EP-1, BROWN, 8000                                    MAXIMUM RPM, PACKAGED 6 PER BOX, STERILE

## (undated) DEVICE — PACK PROCEDURE SURG CATH CUST

## (undated) DEVICE — CATH GUID COR EB30 6FR 100CM -- LAUNCHER

## (undated) DEVICE — DRAPE,ANGIO,BRACH,STERILE,38X44: Brand: MEDLINE

## (undated) DEVICE — STERILE POLYISOPRENE POWDER-FREE SURGICAL GLOVES WITH EMOLLIENT COATING: Brand: PROTEXIS

## (undated) DEVICE — NEEDLE SUT PASS FOR ROT CUF LABRAL REP MULTFI SCORPION

## (undated) DEVICE — SOLUTION SURG PREP 26 CC PURPREP

## (undated) DEVICE — TUBE IRRIG L8IN LNG PT W/ CONN FOR PMP SYS REDEUCE

## (undated) DEVICE — STERILE POLYISOPRENE POWDER-FREE SURGICAL GLOVES: Brand: PROTEXIS

## (undated) DEVICE — PRESSURE MONITORING SET: Brand: TRUWAVE

## (undated) DEVICE — STOPCOCK TRNSDUC 500PSI 3 W ROT M LUER LT BLU OFF HNDL R

## (undated) DEVICE — ULTRATAPE 2MM BLUE 38 PKG OF 6

## (undated) DEVICE — TUBING PRSS MON L24IN PVC RIG NONEXPANDING M TO FEM CONN

## (undated) DEVICE — 5.5 MM STONECUTTER STRAIGHT BURRS,                                    POWER/EP-1, OLIVE, 8000 MAXIMUM RPM,                                    PACKAGED 6 PER BOX, STERILE

## (undated) DEVICE — SUPER TURBOVAC 90 INTEGRATED CABLE WAND ICW: Brand: COBLATION

## (undated) DEVICE — DEVON™ KNEE AND BODY STRAP 60" X 3" (1.5 M X 7.6 CM): Brand: DEVON

## (undated) DEVICE — PACK PROCEDURE SURG SHLDR ORTHOPEDIC CUST

## (undated) DEVICE — TUBING PMP L8FT LNG W/ CONN FOR AR-6400 REDEUCE

## (undated) DEVICE — FIRSTPASS ST SUTURE PASSER, SELF CAPTURE: Brand: FIRSTPASS

## (undated) DEVICE — NC TREK CORONARY DILATATION CATHETER 3.0 MM X 12 MM / RAPID-EXCHANGE: Brand: NC TREK

## (undated) DEVICE — SENSOR PLSE OXMTR AD CBL L36IN ADH FRM FIT SPO2 DISP

## (undated) DEVICE — SHIELD RAD 14X16 IN W/ SCOOP ABSORBER

## (undated) DEVICE — BAND RADIAL COMPR ARTERY 24CM -- REG BX/10